# Patient Record
Sex: FEMALE | Race: WHITE | Employment: FULL TIME | URBAN - METROPOLITAN AREA
[De-identification: names, ages, dates, MRNs, and addresses within clinical notes are randomized per-mention and may not be internally consistent; named-entity substitution may affect disease eponyms.]

---

## 2018-04-30 ENCOUNTER — HOSPITAL ENCOUNTER (OUTPATIENT)
Age: 48
Setting detail: SPECIMEN
Discharge: HOME | End: 2018-04-30
Payer: COMMERCIAL

## 2018-04-30 ENCOUNTER — OFFICE VISIT (OUTPATIENT)
Dept: FAMILY MEDICINE CLINIC | Age: 48
End: 2018-04-30
Payer: COMMERCIAL

## 2018-04-30 VITALS
RESPIRATION RATE: 16 BRPM | OXYGEN SATURATION: 100 % | WEIGHT: 163.4 LBS | BODY MASS INDEX: 26.26 KG/M2 | HEIGHT: 66 IN | HEART RATE: 87 BPM | DIASTOLIC BLOOD PRESSURE: 75 MMHG | SYSTOLIC BLOOD PRESSURE: 124 MMHG

## 2018-04-30 DIAGNOSIS — G90.A POTS (POSTURAL ORTHOSTATIC TACHYCARDIA SYNDROME): ICD-10-CM

## 2018-04-30 DIAGNOSIS — M43.6 NECK STIFFNESS: ICD-10-CM

## 2018-04-30 DIAGNOSIS — Z00.00 ROUTINE GENERAL MEDICAL EXAMINATION AT A HEALTH CARE FACILITY: ICD-10-CM

## 2018-04-30 DIAGNOSIS — Z76.89 ENCOUNTER TO ESTABLISH CARE: Primary | ICD-10-CM

## 2018-04-30 DIAGNOSIS — R53.83 FATIGUE, UNSPECIFIED TYPE: ICD-10-CM

## 2018-04-30 DIAGNOSIS — J34.89 SINUS PRESSURE: ICD-10-CM

## 2018-04-30 LAB
ABSOLUTE EOS #: 0.4 K/UL (ref 0–0.44)
ABSOLUTE IMMATURE GRANULOCYTE: <0.03 K/UL (ref 0–0.3)
ABSOLUTE LYMPH #: 1.15 K/UL (ref 1.1–3.7)
ABSOLUTE MONO #: 0.72 K/UL (ref 0.1–1.2)
ANION GAP SERPL CALCULATED.3IONS-SCNC: 10 MMOL/L (ref 9–17)
BASOPHILS # BLD: 1 % (ref 0–2)
BASOPHILS ABSOLUTE: 0.04 K/UL (ref 0–0.2)
BUN BLDV-MCNC: 9 MG/DL (ref 6–20)
BUN/CREAT BLD: NORMAL (ref 9–20)
C-REACTIVE PROTEIN: 22.3 MG/L (ref 0–5)
CALCIUM SERPL-MCNC: 8.9 MG/DL (ref 8.6–10.4)
CHLORIDE BLD-SCNC: 105 MMOL/L (ref 98–107)
CHOLESTEROL/HDL RATIO: 2.6
CHOLESTEROL: 146 MG/DL
CO2: 25 MMOL/L (ref 20–31)
CREAT SERPL-MCNC: 0.58 MG/DL (ref 0.5–0.9)
DIFFERENTIAL TYPE: ABNORMAL
EOSINOPHILS RELATIVE PERCENT: 5 % (ref 1–4)
FERRITIN: 69 UG/L (ref 13–150)
GFR AFRICAN AMERICAN: >60 ML/MIN
GFR NON-AFRICAN AMERICAN: >60 ML/MIN
GFR SERPL CREATININE-BSD FRML MDRD: NORMAL ML/MIN/{1.73_M2}
GFR SERPL CREATININE-BSD FRML MDRD: NORMAL ML/MIN/{1.73_M2}
GLUCOSE BLD-MCNC: 93 MG/DL (ref 70–99)
HCT VFR BLD CALC: 40.4 % (ref 36.3–47.1)
HDLC SERPL-MCNC: 56 MG/DL
HEMOGLOBIN: 12.8 G/DL (ref 11.9–15.1)
IMMATURE GRANULOCYTES: 0 %
LDL CHOLESTEROL: 82 MG/DL (ref 0–130)
LYMPHOCYTES # BLD: 14 % (ref 24–43)
MCH RBC QN AUTO: 29 PG (ref 25.2–33.5)
MCHC RBC AUTO-ENTMCNC: 31.7 G/DL (ref 28.4–34.8)
MCV RBC AUTO: 91.6 FL (ref 82.6–102.9)
MONOCYTES # BLD: 8 % (ref 3–12)
NRBC AUTOMATED: 0 PER 100 WBC
PDW BLD-RTO: 12.6 % (ref 11.8–14.4)
PLATELET # BLD: 196 K/UL (ref 138–453)
PLATELET ESTIMATE: ABNORMAL
PMV BLD AUTO: 10.7 FL (ref 8.1–13.5)
POTASSIUM SERPL-SCNC: 4.1 MMOL/L (ref 3.7–5.3)
RBC # BLD: 4.41 M/UL (ref 3.95–5.11)
RBC # BLD: ABNORMAL 10*6/UL
SEDIMENTATION RATE, ERYTHROCYTE: 5 MM (ref 0–20)
SEG NEUTROPHILS: 72 % (ref 36–65)
SEGMENTED NEUTROPHILS ABSOLUTE COUNT: 6.2 K/UL (ref 1.5–8.1)
SODIUM BLD-SCNC: 140 MMOL/L (ref 135–144)
TRIGL SERPL-MCNC: 42 MG/DL
TSH SERPL DL<=0.05 MIU/L-ACNC: 2.05 MIU/L (ref 0.3–5)
VITAMIN B-12: 614 PG/ML (ref 232–1245)
VLDLC SERPL CALC-MCNC: NORMAL MG/DL (ref 1–30)
WBC # BLD: 8.5 K/UL (ref 3.5–11.3)
WBC # BLD: ABNORMAL 10*3/UL

## 2018-04-30 PROCEDURE — 99204 OFFICE O/P NEW MOD 45 MIN: CPT | Performed by: FAMILY MEDICINE

## 2018-04-30 RX ORDER — FLUTICASONE PROPIONATE 50 MCG
2 SPRAY, SUSPENSION (ML) NASAL DAILY
Qty: 1 BOTTLE | Refills: 1 | Status: SHIPPED | OUTPATIENT
Start: 2018-04-30 | End: 2022-10-06 | Stop reason: CLARIF

## 2018-04-30 RX ORDER — CYCLOBENZAPRINE HCL 5 MG
5 TABLET ORAL 3 TIMES DAILY PRN
Qty: 60 TABLET | Refills: 0 | Status: SHIPPED | OUTPATIENT
Start: 2018-04-30 | End: 2018-05-10

## 2018-04-30 RX ORDER — PYRIDOSTIGMINE BROMIDE 60 MG/1
60 TABLET ORAL 2 TIMES DAILY
COMMUNITY

## 2018-04-30 ASSESSMENT — PATIENT HEALTH QUESTIONNAIRE - PHQ9
SUM OF ALL RESPONSES TO PHQ QUESTIONS 1-9: 0
2. FEELING DOWN, DEPRESSED OR HOPELESS: 0
SUM OF ALL RESPONSES TO PHQ9 QUESTIONS 1 & 2: 0
1. LITTLE INTEREST OR PLEASURE IN DOING THINGS: 0

## 2018-04-30 ASSESSMENT — ENCOUNTER SYMPTOMS
BLOOD IN STOOL: 0
SHORTNESS OF BREATH: 0
EYE PAIN: 0

## 2018-05-01 LAB
ESTIMATED AVERAGE GLUCOSE: 103 MG/DL
HBA1C MFR BLD: 5.2 % (ref 4–6)

## 2019-02-15 ENCOUNTER — OFFICE VISIT (OUTPATIENT)
Dept: FAMILY MEDICINE CLINIC | Age: 49
End: 2019-02-15
Payer: COMMERCIAL

## 2019-02-15 VITALS
BODY MASS INDEX: 24.75 KG/M2 | RESPIRATION RATE: 16 BRPM | SYSTOLIC BLOOD PRESSURE: 128 MMHG | HEIGHT: 66 IN | WEIGHT: 154 LBS | OXYGEN SATURATION: 100 % | DIASTOLIC BLOOD PRESSURE: 85 MMHG | HEART RATE: 83 BPM

## 2019-02-15 DIAGNOSIS — R05.9 COUGH: Primary | ICD-10-CM

## 2019-02-15 DIAGNOSIS — R06.2 WHEEZING: ICD-10-CM

## 2019-02-15 DIAGNOSIS — Z12.39 SCREENING FOR BREAST CANCER: ICD-10-CM

## 2019-02-15 DIAGNOSIS — R06.09 DYSPNEA ON EXERTION: ICD-10-CM

## 2019-02-15 DIAGNOSIS — R09.81 CONGESTION OF NASAL SINUS: ICD-10-CM

## 2019-02-15 PROCEDURE — 99214 OFFICE O/P EST MOD 30 MIN: CPT | Performed by: FAMILY MEDICINE

## 2019-02-15 RX ORDER — BUDESONIDE AND FORMOTEROL FUMARATE DIHYDRATE 160; 4.5 UG/1; UG/1
2 AEROSOL RESPIRATORY (INHALATION) 2 TIMES DAILY
Qty: 1 INHALER | Refills: 3 | Status: SHIPPED | OUTPATIENT
Start: 2019-02-15 | End: 2019-05-15 | Stop reason: SDUPTHER

## 2019-02-15 RX ORDER — ALBUTEROL SULFATE 1.25 MG/3ML
1 SOLUTION RESPIRATORY (INHALATION) EVERY 6 HOURS PRN
COMMUNITY
End: 2019-02-15 | Stop reason: SDUPTHER

## 2019-02-15 RX ORDER — ALBUTEROL SULFATE 90 UG/1
2 AEROSOL, METERED RESPIRATORY (INHALATION) EVERY 6 HOURS PRN
Qty: 1 INHALER | Refills: 2 | Status: SHIPPED | OUTPATIENT
Start: 2019-02-15 | End: 2021-08-19

## 2019-02-15 RX ORDER — ALBUTEROL SULFATE 90 UG/1
2 AEROSOL, METERED RESPIRATORY (INHALATION) EVERY 6 HOURS PRN
COMMUNITY
End: 2019-02-15 | Stop reason: SDUPTHER

## 2019-02-15 RX ORDER — ALBUTEROL SULFATE 1.25 MG/3ML
1 SOLUTION RESPIRATORY (INHALATION) EVERY 6 HOURS PRN
Qty: 360 ML | Refills: 2 | Status: SHIPPED | OUTPATIENT
Start: 2019-02-15 | End: 2021-08-19

## 2019-02-15 ASSESSMENT — ENCOUNTER SYMPTOMS
EYE PAIN: 0
VOICE CHANGE: 1
SHORTNESS OF BREATH: 1
WHEEZING: 1
BLOOD IN STOOL: 0
COUGH: 1
SORE THROAT: 0
TROUBLE SWALLOWING: 0

## 2019-02-15 ASSESSMENT — PATIENT HEALTH QUESTIONNAIRE - PHQ9
1. LITTLE INTEREST OR PLEASURE IN DOING THINGS: 0
2. FEELING DOWN, DEPRESSED OR HOPELESS: 0
SUM OF ALL RESPONSES TO PHQ9 QUESTIONS 1 & 2: 0
SUM OF ALL RESPONSES TO PHQ QUESTIONS 1-9: 0
SUM OF ALL RESPONSES TO PHQ QUESTIONS 1-9: 0

## 2019-02-19 DIAGNOSIS — Z12.39 SCREENING FOR BREAST CANCER: ICD-10-CM

## 2019-03-07 ENCOUNTER — HOSPITAL ENCOUNTER (OUTPATIENT)
Dept: GENERAL RADIOLOGY | Age: 49
Discharge: HOME | End: 2019-03-09
Payer: COMMERCIAL

## 2019-03-07 ENCOUNTER — HOSPITAL ENCOUNTER (OUTPATIENT)
Age: 49
Discharge: HOME | End: 2019-03-09
Payer: COMMERCIAL

## 2019-03-07 ENCOUNTER — HOSPITAL ENCOUNTER (OUTPATIENT)
Dept: NEUROLOGY | Age: 49
Discharge: HOME | End: 2019-03-07
Payer: COMMERCIAL

## 2019-03-07 DIAGNOSIS — R05.9 COUGH: ICD-10-CM

## 2019-03-07 DIAGNOSIS — R06.2 WHEEZING: ICD-10-CM

## 2019-03-07 DIAGNOSIS — R06.09 DYSPNEA ON EXERTION: ICD-10-CM

## 2019-03-07 PROCEDURE — 94060 EVALUATION OF WHEEZING: CPT

## 2019-03-07 PROCEDURE — 94664 DEMO&/EVAL PT USE INHALER: CPT

## 2019-03-07 PROCEDURE — 71046 X-RAY EXAM CHEST 2 VIEWS: CPT

## 2019-03-07 PROCEDURE — 94726 PLETHYSMOGRAPHY LUNG VOLUMES: CPT

## 2019-03-07 PROCEDURE — 94729 DIFFUSING CAPACITY: CPT

## 2019-03-07 PROCEDURE — 6370000000 HC RX 637 (ALT 250 FOR IP): Performed by: FAMILY MEDICINE

## 2019-03-07 RX ORDER — ALBUTEROL SULFATE 90 UG/1
2 AEROSOL, METERED RESPIRATORY (INHALATION) ONCE
Status: COMPLETED | OUTPATIENT
Start: 2019-03-07 | End: 2019-03-07

## 2019-03-07 RX ADMIN — ALBUTEROL SULFATE 2 PUFF: 90 AEROSOL, METERED RESPIRATORY (INHALATION) at 13:43

## 2019-03-08 ENCOUNTER — OFFICE VISIT (OUTPATIENT)
Dept: FAMILY MEDICINE CLINIC | Age: 49
End: 2019-03-08
Payer: COMMERCIAL

## 2019-03-08 ENCOUNTER — HOSPITAL ENCOUNTER (OUTPATIENT)
Age: 49
Setting detail: SPECIMEN
Discharge: HOME | End: 2019-03-08
Payer: COMMERCIAL

## 2019-03-08 VITALS
OXYGEN SATURATION: 99 % | BODY MASS INDEX: 25.13 KG/M2 | HEART RATE: 71 BPM | RESPIRATION RATE: 16 BRPM | WEIGHT: 156.4 LBS | HEIGHT: 66 IN

## 2019-03-08 DIAGNOSIS — R05.3 CHRONIC COUGH: ICD-10-CM

## 2019-03-08 DIAGNOSIS — R05.3 CHRONIC COUGH: Primary | ICD-10-CM

## 2019-03-08 DIAGNOSIS — Z00.00 ROUTINE GENERAL MEDICAL EXAMINATION AT A HEALTH CARE FACILITY: ICD-10-CM

## 2019-03-08 LAB
ANION GAP SERPL CALCULATED.3IONS-SCNC: 12 MMOL/L (ref 9–17)
BUN BLDV-MCNC: 13 MG/DL (ref 6–20)
BUN/CREAT BLD: NORMAL (ref 9–20)
CALCIUM SERPL-MCNC: 9 MG/DL (ref 8.6–10.4)
CHLORIDE BLD-SCNC: 107 MMOL/L (ref 98–107)
CHOLESTEROL/HDL RATIO: 2.7
CHOLESTEROL: 164 MG/DL
CO2: 21 MMOL/L (ref 20–31)
CREAT SERPL-MCNC: 0.62 MG/DL (ref 0.5–0.9)
ESTIMATED AVERAGE GLUCOSE: 105 MG/DL
GFR AFRICAN AMERICAN: >60 ML/MIN
GFR NON-AFRICAN AMERICAN: >60 ML/MIN
GFR SERPL CREATININE-BSD FRML MDRD: NORMAL ML/MIN/{1.73_M2}
GFR SERPL CREATININE-BSD FRML MDRD: NORMAL ML/MIN/{1.73_M2}
GLUCOSE BLD-MCNC: 85 MG/DL (ref 70–99)
HBA1C MFR BLD: 5.3 % (ref 4–6)
HCT VFR BLD CALC: 40.5 % (ref 36.3–47.1)
HDLC SERPL-MCNC: 60 MG/DL
HEMOGLOBIN: 13.1 G/DL (ref 11.9–15.1)
LDL CHOLESTEROL: 92 MG/DL (ref 0–130)
MCH RBC QN AUTO: 29.9 PG (ref 25.2–33.5)
MCHC RBC AUTO-ENTMCNC: 32.3 G/DL (ref 28.4–34.8)
MCV RBC AUTO: 92.5 FL (ref 82.6–102.9)
NRBC AUTOMATED: 0 PER 100 WBC
PDW BLD-RTO: 13.1 % (ref 11.8–14.4)
PLATELET # BLD: 254 K/UL (ref 138–453)
PMV BLD AUTO: 10.6 FL (ref 8.1–13.5)
POTASSIUM SERPL-SCNC: 4 MMOL/L (ref 3.7–5.3)
RBC # BLD: 4.38 M/UL (ref 3.95–5.11)
SODIUM BLD-SCNC: 140 MMOL/L (ref 135–144)
TRIGL SERPL-MCNC: 59 MG/DL
VLDLC SERPL CALC-MCNC: NORMAL MG/DL (ref 1–30)
WBC # BLD: 7.2 K/UL (ref 3.5–11.3)

## 2019-03-08 PROCEDURE — 99213 OFFICE O/P EST LOW 20 MIN: CPT | Performed by: FAMILY MEDICINE

## 2019-03-08 RX ORDER — MONTELUKAST SODIUM 10 MG/1
10 TABLET ORAL DAILY
Qty: 30 TABLET | Refills: 5 | Status: SHIPPED | OUTPATIENT
Start: 2019-03-08 | End: 2020-10-19 | Stop reason: ALTCHOICE

## 2019-03-08 RX ORDER — PREDNISONE 20 MG/1
40 TABLET ORAL DAILY
Qty: 10 TABLET | Refills: 0 | Status: SHIPPED | OUTPATIENT
Start: 2019-03-08 | End: 2019-03-13

## 2019-03-08 ASSESSMENT — ENCOUNTER SYMPTOMS
WHEEZING: 1
COUGH: 1
SHORTNESS OF BREATH: 0

## 2019-03-09 LAB — HIV AG/AB: NONREACTIVE

## 2019-05-15 ENCOUNTER — OFFICE VISIT (OUTPATIENT)
Dept: PULMONOLOGY | Age: 49
End: 2019-05-15
Payer: COMMERCIAL

## 2019-05-15 ENCOUNTER — HOSPITAL ENCOUNTER (OUTPATIENT)
Age: 49
Setting detail: SPECIMEN
Discharge: HOME | End: 2019-05-15
Payer: COMMERCIAL

## 2019-05-15 VITALS
TEMPERATURE: 97.4 F | BODY MASS INDEX: 25.71 KG/M2 | DIASTOLIC BLOOD PRESSURE: 91 MMHG | OXYGEN SATURATION: 96 % | SYSTOLIC BLOOD PRESSURE: 145 MMHG | HEIGHT: 66 IN | RESPIRATION RATE: 14 BRPM | WEIGHT: 160 LBS | HEART RATE: 94 BPM

## 2019-05-15 DIAGNOSIS — J45.50 SEVERE PERSISTENT ACUTE ASTHMATIC BRONCHITIS: ICD-10-CM

## 2019-05-15 DIAGNOSIS — J45.51 SEVERE PERSISTENT ASTHMA WITH ACUTE EXACERBATION: Primary | ICD-10-CM

## 2019-05-15 PROCEDURE — 99204 OFFICE O/P NEW MOD 45 MIN: CPT | Performed by: INTERNAL MEDICINE

## 2019-05-15 RX ORDER — PREDNISONE 10 MG/1
10 TABLET ORAL 2 TIMES DAILY
Qty: 10 TABLET | Refills: 0 | Status: SHIPPED | OUTPATIENT
Start: 2019-05-15 | End: 2019-05-20

## 2019-05-15 RX ORDER — BUDESONIDE AND FORMOTEROL FUMARATE DIHYDRATE 160; 4.5 UG/1; UG/1
2 AEROSOL RESPIRATORY (INHALATION) 2 TIMES DAILY
Qty: 1 INHALER | Refills: 3 | Status: SHIPPED | OUTPATIENT
Start: 2019-05-15 | End: 2021-08-19

## 2019-05-15 ASSESSMENT — ASTHMA QUESTIONNAIRES
QUESTION_4 LAST FOUR WEEKS HOW OFTEN HAVE YOU USED YOUR RESCUE INHALER OR NEBULIZER MEDICATION (SUCH AS ALBUTEROL): 1
QUESTION_2 LAST FOUR WEEKS HOW OFTEN HAVE YOU HAD SHORTNESS OF BREATH: 1
QUESTION_5 LAST FOUR WEEKS HOW WOULD YOU RATE YOUR ASTHMA CONTROL: 2
QUESTION_1 LAST FOUR WEEKS HOW MUCH OF THE TIME DID YOUR ASTHMA KEEP YOU FROM GETTING AS MUCH DONE AT WORK, SCHOOL OR AT HOME: 3
ACT_TOTALSCORE: 8
QUESTION_3 LAST FOUR WEEKS HOW OFTEN DID YOUR ASTHMA SYMPTOMS (WHEEZING, COUGHING, SHORTNESS OF BREATH, CHEST TIGHTNESS OR PAIN) WAKE YOU UP AT NIGHT OR EARLIER THAN USUAL IN THE MORNING: 1

## 2019-05-15 NOTE — PROGRESS NOTES
PULMONARY MEDICINE OUTPATIENT  CONSULT                                                                        Patient:  Lis Gaspar  YOB: 1970  MRN: [id number]     REFERRED BY: Buck Appiah MD     HISTORY     CHIEF COMPLIANT/REASON FOR CONSULTATION: Cough    HISTORY OF PRESENT ILLNESS:    Lis Gaspar is an 51 y.o.  female for initial evaluation of asthma, currently in exacerbation. She has not been previously diagnosed with asthma. Her symptoms began in Dec 2018 and she has been previously treated twice with steroids/antibiotic courses. Associated symptoms include nonproductive cough and wheezing. She reports that her cough and wheezing keeps her up throughout the night. Symptoms have been gradually worsening since their onset. She is currently on Symbicort, albuterol inhaler/nebulizer, Atrovent, Singulair and Flonase. She reports that she ran out of her Symbicort about 2 weeks back and did not get a refill as she was scheduled to see us in the clinic. Her asthma seems to be very poorly controlled and her total score on ACT is 8    ASTHMA CONTROL TEST 5/15/2019   In the past 4 weeks, how much of the time did your asthma keep you from getting as much done at work, school or at home? 3   During the past 4 weeks, how often have you had shortness of breath? 1   During the past 4 weeks, how often did your asthma symptoms (wheezing, coughing, shortness of breath, chest tightness or pain) wake you up at night or earlier than usual in the morning? 1   During the past 4 weeks, how often have you used your rescue inhaler or nebulizer medication (such as albuterol)? 1   How would you rate your asthma control during the past 4 weeks? 2   Asthma Control Test Total Score 8     She denies any history of childhood asthma or asthma symptoms during her teenage. She reports that she was previously physically active and has not had any problems with shortness of breath.   She does have a lymph nodes  Endocrine: negative for - polydipsia/polyuria or temperature intolerance  Respiratory: positive for - cough, shortness of breath and wheezing  negative for - pleuritic pain  Cardiovascular: negative for - chest pain, palpitations or paroxysmal nocturnal dyspnea  Gastrointestinal: negative for - abdominal pain, change in bowel habits or nausea/vomiting  Genito-Urinary: no dysuria, trouble voiding, or hematuria  Musculoskeletal: negative for - joint pain or joint swelling  Neurological: negative for - headaches, impaired coordination/balance, speech problems or weakness  Dermatological: negative for - rash or skin lesion changes     ALLERGIES:    No Known Allergies  MEDICATIONS:   Outpatient Encounter Medications as of 5/15/2019   Medication Sig Dispense Refill    budesonide-formoterol (SYMBICORT) 160-4.5 MCG/ACT AERO Inhale 2 puffs into the lungs 2 times daily 1 Inhaler 3    tiotropium (SPIRIVA HANDIHALER) 18 MCG inhalation capsule Inhale 1 capsule into the lungs daily 30 capsule 3    predniSONE (DELTASONE) 10 MG tablet Take 1 tablet by mouth 2 times daily for 5 days 10 tablet 0    montelukast (SINGULAIR) 10 MG tablet Take 1 tablet by mouth daily 30 tablet 5    albuterol sulfate  (90 Base) MCG/ACT inhaler Inhale 2 puffs into the lungs every 6 hours as needed for Wheezing 1 Inhaler 2    albuterol (ACCUNEB) 1.25 MG/3ML nebulizer solution Inhale 3 mLs into the lungs every 6 hours as needed for Wheezing 360 mL 2    pyridostigmine (MESTINON) 60 MG tablet Take 60 mg by mouth 2 times daily       fluticasone (FLONASE) 50 MCG/ACT nasal spray 2 sprays by Nasal route daily 1 Bottle 1    [DISCONTINUED] budesonide-formoterol (SYMBICORT) 160-4.5 MCG/ACT AERO Inhale 2 puffs into the lungs 2 times daily 1 Inhaler 3    [DISCONTINUED] ipratropium (ATROVENT HFA) 17 MCG/ACT inhaler Inhale 1 puff into the lungs every 6-8 hours as needed for Wheezing (cough) 1 Inhaler 2     No facility-administered encounter 04/30/2018    RBC 4.38 03/08/2019    MCH 29.9 03/08/2019    MCHC 32.3 03/08/2019    RDW 13.1 03/08/2019     BMP:   Lab Results   Component Value Date     03/08/2019    K 4.0 03/08/2019     03/08/2019    CO2 21 03/08/2019    BUN 13 03/08/2019    CREATININE 0.62 03/08/2019    GLUCOSE 85 03/08/2019    CALCIUM 9.0 03/08/2019       Liver Function Test:   No results found for: ALT, AST, GGT, ALKPHOS, BILITOT  Coagulation Profile:   No results found for: INR, PROTIME, APTT  Cardiac Enzymes:  No results found for: CKTOTAL, CKMB, CKMBINDEX, TROPONINI  Lactic Acid:  No results found for: LACTA  BNP:   No results found for: BNP  D-Dimer:  No results found for: DDIMER  Others labs:  Lab Results   Component Value Date    TSH 2.05 04/30/2018     No results found for: Marysol [place]  Lab Results   Component Value Date    SEDRATE 5 04/30/2018    CRP 22.3 (H) 04/30/2018     Lab Results   Component Value Date    FERRITIN 69 04/30/2018     No results found for: SPEP, UPEP  No results found for: PSA, CEA, , [id number],     Radiological reports:  CXR: 3/7/19  FINDINGS:   Heart size is normal.  No vascular congestion, focal consolidation, effusion,   or pneumothorax is noted.  Osseous and mediastinal structures are   age-appropriate.           Impression   No acute cardiopulmonary disease. CT Scans:    Pulmonary Function Test: 3/7/19  IMPRESSION:  Moderate-to-severe obstructive pulmonary disease with air  trapping, normal diffusion and significant improvement after  bronchodilators noted in FEV1 only. Clinical correlation is  recommended. Echocardiogram:    Stress Test/Cardiac Angiography:    Polysomnography:    ASSESSMENT AND PLAN     Assessment:  1.  Severe persistent asthma with acute exacerbation      Plan/Recommendations:  Pulmonary function tests were reviewed  Chest x-ray was reviewed  Prescribed a 5 day course of prednisone and Spiriva  Will check IgE and eosinophil count  Refills were provided for Symbicort  Continue albuterol inhaler/nebulizer, along with Singulair and Flonase. We'll recommend to discontinue Atrovent inhaler  Educated and clarified the medication use. Discussed use, benefit, and side effects of prescribed medications. Barriers to medication compliance addressed. Patient was recommended to have prednisone available for use during an exacerbation  Influenza Vaccine: Recommend flu vaccination in the fall annually. Indicated for current flu vaccination season Oct. to Feb.  Pneumococcal Vaccine: Recommendations given regarding pneumococcal vaccinations. Deferred  Lung cancer Screening: After reviewing the patient's smoking history and age patient LOW DOSE SCREENING CT is NOT INDICATED; Nonsmoker/Smoking <30 pack-years   Patient received counseling on the following healthy behaviors: nutrition, exercise and medication adherence  Maintain an active lifestyle    All the questions that the patient had were answered to his/her satisfaction  We'll see the patient back in 4 weeks  Thank you for having us involved in the care of your patient. Please call us if you have any questions or concerns. Néstor Ramirez MD    Pulmonary and Critical Care Medicine          5/15/2019, 1:56 PM    Please note that this chart was generated using voice recognition Dragon dictation software. Although every effort was made to ensure the accuracy of this automated transcription, some errors in transcription may have occurred.

## 2019-05-16 LAB
EOSINOPHILS ABSOLUTE: 823 /UL (ref 200–400)
EOSINOPHILS RELATIVE PERCENT: ABNORMAL %
IGE: 199 IU/ML
WBC # BLD: ABNORMAL K/UL

## 2020-07-27 ENCOUNTER — OFFICE VISIT (OUTPATIENT)
Dept: FAMILY MEDICINE CLINIC | Age: 50
End: 2020-07-27
Payer: COMMERCIAL

## 2020-07-27 VITALS
HEART RATE: 95 BPM | HEIGHT: 64 IN | BODY MASS INDEX: 26.91 KG/M2 | WEIGHT: 157.6 LBS | TEMPERATURE: 98.3 F | OXYGEN SATURATION: 98 % | SYSTOLIC BLOOD PRESSURE: 132 MMHG | DIASTOLIC BLOOD PRESSURE: 87 MMHG

## 2020-07-27 PROCEDURE — 99396 PREV VISIT EST AGE 40-64: CPT | Performed by: FAMILY MEDICINE

## 2020-07-27 RX ORDER — ZOSTER VACCINE RECOMBINANT, ADJUVANTED 50 MCG/0.5
0.5 KIT INTRAMUSCULAR SEE ADMIN INSTRUCTIONS
Qty: 0.5 ML | Refills: 1 | Status: SHIPPED | OUTPATIENT
Start: 2020-07-27 | End: 2021-01-23

## 2020-07-27 ASSESSMENT — PATIENT HEALTH QUESTIONNAIRE - PHQ9
SUM OF ALL RESPONSES TO PHQ9 QUESTIONS 1 & 2: 0
1. LITTLE INTEREST OR PLEASURE IN DOING THINGS: 0
2. FEELING DOWN, DEPRESSED OR HOPELESS: 0
SUM OF ALL RESPONSES TO PHQ QUESTIONS 1-9: 0
SUM OF ALL RESPONSES TO PHQ QUESTIONS 1-9: 0

## 2020-07-27 NOTE — PROGRESS NOTES
Subjective:       Carson Byrnes is a 47 y.o. female and is here for a comprehensive physical exam.  The patient reports no problems    . [employer]. . 2 kids. 2 daughters. No violence at the current living place. No concerns about sexual transmitted diseases. Tobacco use: None  Alcohol use: None  Other drug use: None  Depressed: Not depressed    Mom 70 yo - healthy  Dad 73 yo - pancreatic cancer. . Siblings: 2 - well. Vaccinations: yes. Mammogram: Is due for mammogram.  Pap smear: Will follow-up with gynecologist for the next year. Colonoscopy: Never had a colonoscopy done.  History:  Any STD's in the past? none  Past Medical History:   Diagnosis Date    Chronic cough     Neurocardiogenic syncope     POTS (postural orthostatic tachycardia syndrome)     Wheezing      Patient Active Problem List    Diagnosis Date Noted    Chronic cough 2019    POTS (postural orthostatic tachycardia syndrome) 2018    Fatigue 2018    Neck stiffness 2018     No past surgical history on file.   Family History   Problem Relation Age of Onset    Cancer Father     Other Father      Social History     Socioeconomic History    Marital status:      Spouse name: Not on file    Number of children: Not on file    Years of education: Not on file    Highest education level: Not on file   Occupational History    Not on file   Social Needs    Financial resource strain: Not on file    Food insecurity     Worry: Not on file     Inability: Not on file   [employer] needs     Medical: Not on file     Non-medical: Not on file   Tobacco Use    Smoking status: Former Smoker    Smokeless tobacco: Never Used   Substance and Sexual Activity    Alcohol use: No    Drug use: No    Sexual activity: Yes   Lifestyle    Physical activity     Days per week: Not on file     Minutes per session: Not on file    Stress: Not on file   Relationships    Social connections     Talks on phone: Not on file     Gets together: Not on file     Attends Holiness service: Not on file     Active member of club or organization: Not on file     Attends meetings of clubs or organizations: Not on file     Relationship status: Not on file    Intimate partner violence     Fear of current or ex partner: Not on file     Emotionally abused: Not on file     Physically abused: Not on file     Forced sexual activity: Not on file   Other Topics Concern    Not on file   Social History Narrative    Not on file     Current Outpatient Medications   Medication Sig Dispense Refill    zoster recombinant adjuvanted vaccine (SHINGRIX) 50 MCG/0.5ML SUSR injection Inject 0.5 mLs into the muscle See Admin Instructions 1 dose now and repeat in 2-6 months 0.5 mL 1    budesonide-formoterol (SYMBICORT) 160-4.5 MCG/ACT AERO Inhale 2 puffs into the lungs 2 times daily 1 Inhaler 3    tiotropium (SPIRIVA HANDIHALER) 18 MCG inhalation capsule Inhale 1 capsule into the lungs daily 30 capsule 3    montelukast (SINGULAIR) 10 MG tablet Take 1 tablet by mouth daily 30 tablet 5    albuterol sulfate  (90 Base) MCG/ACT inhaler Inhale 2 puffs into the lungs every 6 hours as needed for Wheezing 1 Inhaler 2    albuterol (ACCUNEB) 1.25 MG/3ML nebulizer solution Inhale 3 mLs into the lungs every 6 hours as needed for Wheezing 360 mL 2    pyridostigmine (MESTINON) 60 MG tablet Take 60 mg by mouth 2 times daily       fluticasone (FLONASE) 50 MCG/ACT nasal spray 2 sprays by Nasal route daily 1 Bottle 1     No current facility-administered medications for this visit.       Current Outpatient Medications on File Prior to Visit   Medication Sig Dispense Refill    budesonide-formoterol (SYMBICORT) 160-4.5 MCG/ACT AERO Inhale 2 puffs into the lungs 2 times daily 1 Inhaler 3    tiotropium (SPIRIVA HANDIHALER) 18 MCG inhalation capsule Inhale 1 capsule into the lungs daily 30 capsule 3    montelukast (SINGULAIR) 10 MG tablet Take 1 tablet by mouth daily 30 tablet 5    albuterol sulfate  (90 Base) MCG/ACT inhaler Inhale 2 puffs into the lungs every 6 hours as needed for Wheezing 1 Inhaler 2    albuterol (ACCUNEB) 1.25 MG/3ML nebulizer solution Inhale 3 mLs into the lungs every 6 hours as needed for Wheezing 360 mL 2    pyridostigmine (MESTINON) 60 MG tablet Take 60 mg by mouth 2 times daily       fluticasone (FLONASE) 50 MCG/ACT nasal spray 2 sprays by Nasal route daily 1 Bottle 1     No current facility-administered medications on file prior to visit. No Known Allergies    Do you take any herbs or supplements that were not prescribed by a doctor? no  Are you taking calcium supplements? not applicable  Are you taking aspirin daily? not applicable    Review of Systems  Do you have pain that bothers you in your daily life? no  Review of Systems   Constitutional: Negative for fever and unexpected weight change. HENT: Negative for ear pain, congestion, sore throat and rhinorrhea. Eyes: Negative for itching and visual disturbance. Respiratory: Negative for cough and shortness of breath. Cardiovascular: Negative for chest pain and leg swelling. Gastrointestinal: Negative for diarrhea, constipation and blood in stool. Endocrine: Negative for polydipsia and polyuria. Genitourinary: Negative for dysuria and hematuria. Musculoskeletal: Negative for back pain and gait problem. Skin: Negative for color change and rash. Neurological: Negative for dizziness and headaches. Psychiatric/Behavioral: Negative for confusion and agitation. Objective:   HENT:   /87   Pulse 95   Temp 98.3 °F (36.8 °C)   Ht 5' 4\" (1.626 m)   Wt 157 lb 9.6 oz (71.5 kg)   SpO2 98%   BMI 27.05 kg/m²   Constitutional: Alert and oriented. Well-nourished. No distress. Head: Normocephalic and atraumatic. Right Ear: External ear normal. TM: no bulging, erythema or fluid seen.   Left Ear: External ear normal. TM: no bulging, erythema or fluid seen. Nose: Nose normal.   Mouth/Throat: Oropharynx is clear and moist. teeth in good repair. Eyes: Pupils are equal, round, and reactive to light. Right eye exhibits no discharge. Left eye exhibits no discharge. No scleral icterus. Neck: Normal range of motion. Neck supple. No JVD present. No tracheal deviation present. No thyromegaly present. Cardiovascular: Normal rate, regular rhythm, normal heart sounds. Pulmonary/Chest: Effort normal and breath sounds normal. No respiratory distress. She has no wheezes. She has no rales. Abdominal: Soft. Bowel sounds are normal.  She exhibits no distension and no mass. There is no tenderness. There is no rebound and no guarding. Musculoskeletal: Normal range of motion. She exhibits no edema or tenderness. Lymphadenopathy:    She has no cervical adenopathy. Neurological:  She is alert and oriented to person, place, and time. Cranial nerves grossly intact. No sensation problem noted. Muscle strength 5/5 throughout. Skin: Skin is warm and dry. No rash noted. No erythema. Freckles throughout. Psychiatric:  She has a normal mood and affect. Behavior is normal.    Christel Crooks was seen today for new patient. Diagnoses and all orders for this visit:    Encounter for well adult exam without abnormal findings  -     CBC Auto Differential; Future  -     Urinalysis; Future  -     TSH with Reflex; Future  -     Lipid Panel; Future  -     Hemoglobin A1C; Future  -     Comprehensive Metabolic Panel; Future    Encounter for screening mammogram for malignant neoplasm of breast  -     PRISCILA DIGITAL SCREEN W OR WO CAD BILATERAL; Future    Colon cancer screening  -     Cologuard; Future    Need for shingles vaccine  -     zoster recombinant adjuvanted vaccine [facility]) 50 MCG/0.5ML SUSR injection;  Inject 0.5 mLs into the muscle See Admin Instructions 1 dose now and repeat in 2-6 months    Skin cancer screening  -     Katrin Brown MD,

## 2020-10-19 ENCOUNTER — HOSPITAL ENCOUNTER (OUTPATIENT)
Age: 50
Setting detail: SPECIMEN
Discharge: HOME | End: 2020-10-19
Payer: COMMERCIAL

## 2020-10-19 ENCOUNTER — OFFICE VISIT (OUTPATIENT)
Dept: DERMATOLOGY | Age: 50
End: 2020-10-19
Payer: COMMERCIAL

## 2020-10-19 VITALS
DIASTOLIC BLOOD PRESSURE: 85 MMHG | TEMPERATURE: 97.8 F | SYSTOLIC BLOOD PRESSURE: 138 MMHG | BODY MASS INDEX: 24.89 KG/M2 | OXYGEN SATURATION: 98 % | HEART RATE: 83 BPM | HEIGHT: 64 IN | WEIGHT: 145.8 LBS

## 2020-10-19 PROCEDURE — 11102 TANGNTL BX SKIN SINGLE LES: CPT | Performed by: DERMATOLOGY

## 2020-10-19 PROCEDURE — 11103 TANGNTL BX SKIN EA SEP/ADDL: CPT | Performed by: DERMATOLOGY

## 2020-10-19 PROCEDURE — 99203 OFFICE O/P NEW LOW 30 MIN: CPT | Performed by: DERMATOLOGY

## 2020-10-19 RX ORDER — LIDOCAINE HYDROCHLORIDE AND EPINEPHRINE 10; 10 MG/ML; UG/ML
1 INJECTION, SOLUTION INFILTRATION; PERINEURAL ONCE
Status: COMPLETED | OUTPATIENT
Start: 2020-10-19 | End: 2020-10-19

## 2020-10-19 RX ADMIN — LIDOCAINE HYDROCHLORIDE AND EPINEPHRINE 1 ML: 10; 10 INJECTION, SOLUTION INFILTRATION; PERINEURAL at 14:16

## 2020-10-19 NOTE — PROGRESS NOTES
Dermatology Patient Note  [id number]  [address] [facility] [address]  Dept: [phone]  Dept Fax: [phone]: 10/19/2020   REFERRING PROVIDER: Luna Thomas MD      Olive Diaz is a 47 y.o. female  who presents today in the office for:    New Patient (Full body skin check. Sister has had skin cancer.)      HISTORY OF PRESENT ILLNESS:  47 y.o. female with h/o excessive sun exposure presents for routine skin check.     Patient reports concerning lesion:    Location: left chest  Duration: months  Symptoms: none  Course: growing  Prior biopsy: none  Prior treatment: none      CURRENT MEDICATIONS:   Current Outpatient Medications   Medication Sig Dispense Refill    albuterol sulfate  (90 Base) MCG/ACT inhaler Inhale 2 puffs into the lungs every 6 hours as needed for Wheezing 1 Inhaler 2    pyridostigmine (MESTINON) 60 MG tablet Take 60 mg by mouth 2 times daily       zoster recombinant adjuvanted vaccine (SHINGRIX) 50 MCG/0.5ML SUSR injection Inject 0.5 mLs into the muscle See Admin Instructions 1 dose now and repeat in 2-6 months 0.5 mL 1    budesonide-formoterol (SYMBICORT) 160-4.5 MCG/ACT AERO Inhale 2 puffs into the lungs 2 times daily (Patient not taking: Reported on 10/19/2020) 1 Inhaler 3    albuterol (ACCUNEB) 1.25 MG/3ML nebulizer solution Inhale 3 mLs into the lungs every 6 hours as needed for Wheezing (Patient not taking: Reported on 10/19/2020) 360 mL 2    fluticasone (FLONASE) 50 MCG/ACT nasal spray 2 sprays by Nasal route daily (Patient not taking: Reported on 10/19/2020) 1 Bottle 1     Current Facility-Administered Medications   Medication Dose Route Frequency Provider Last Rate Last Dose    lidocaine-EPINEPHrine 1 percent-1:[id number] injection 1 mL  1 mL Intradermal Once Dennise Albarado MD           ALLERGIES:   No Known Allergies    SOCIAL HISTORY:  Social History     Tobacco Use    Smoking status: Former Smoker Follows:  Assessment   1. Neoplasm of uncertain behavior of skin x 2  Left shoulder: nevus r/o atypia  Left chest: r/o BCC  Shave Biopsy: The procedure and its risks were explained including but not limited to pain, bleeding, infection, permanent scar, permanent pigment alteration and need for an additional procedure. Consent to proceed with the procedure was obtained from the patient or the parent. After cleaning with alcohol the lesion was anesthetized with 1% lidocaine with epinephrine and was removed with a dermablade. Hemostasis was achieved with aluminum chloride and Vaseline and a bandage were applied.  - Surgical Pathology; Future  - NM TANGENTIAL BIOPSY SKIN SINGLE LESION  - NM TANGENTIAL BIOPSY SKIN EA SEP/ADDITIONAL LESION  - lidocaine-EPINEPHrine 1 percent-1:[id number] injection 1 mL    2. Actinic skin damage  Counseled on sun protection: avoidance, seek shade; use clothing/hats/scarves; use generous quantity of sunscreen, re-apply every 2 hours. 3. Exposure to tanning bed, initial encounter  Counseled avoidance of tanning bed use    4. Dysplastic nevus syndrome  - annual skin checks recommend  - Clinically and Dermatoscopically Benign on Exam Today  - Reviewed ABCDE of moles and melanoma  - Discussed sunscreen and sun protection - recommend SPF 30 or greater sunscreen applied every 2-3 hours, sun protective clothing and avoidance of peak sun. RTC 6 months            Patient Instructions   BIOPSY WOUND CARE    A biopsy is where a small piece of skin tissue is removed and examined by a pathologist.  When a biopsy is done, there is a small wound site that requires proper care to prevent infection and scarring. Some biopsies require sutures and their removal.    How to Care for Biopsy Wound    A.  Leave band-aid or dressing on for 24 hours. B. Wash two times a day with soap and water.   C.  Let the wound air dry, then apply Vaseline ointment and cover with a Band-Aid       unless otherwise instructed by your provider. D. If there is slight discomfort, you may give acetaminophen or ibuprofen. When To Call the Doctor    Call the Dermatology Clinic or your doctor if any of the following occur:    A. Redness and swelling  B. Tenderness and warm to touch  C.  Drainage from wound  D. Fever    Biopsy Results    Biopsy results are usually available in 1-2 weeks. We provide biopsy results in letters for begin results or we will call for any concerning results. If you have not heard from our staff please call the office within 2 weeks. Please call our office with any concerns at [phone]. Sun Protection     There are two types of sun rays that are harmful to the skin. UVA rays cause skin aging and skin cancer, such as melanoma. UVB rays cause sunburns, cataracts, and also contribute to skin cancer. The American-Academy of Dermatology recommends that children and adults wear a broad spectrum, waterproof sunscreen with a Sun Protection Factor (SPF) of 30 or higher. It is important to check the ingredient label to be sure the sunscreen will protect the skin from both UVA and UVB sunrays. Your sunscreen should contain at least one of the following ingredients: titanium dioxide, zinc oxide, or avobenzone. Sunscreen will not be effective unless it is applied to all exposed skin. Sunscreens work best if they are applied 30 minutes before sun exposure. They should be reapplied every 2 hours and after any water exposure. Sunscreen is not perfect. It is important to use other methods to protect the skin from sun exposure also. Wear hats, sunglasses and other sun protective clothing when outdoors. Stay in the shade during the peak hours of sun exposure between 10 AM and 4 PM      Follow-up: No follow-ups on file.       This note was created with the assistance of a speech-recognition program.  Although the intention is to generate a document that actually reflects the content of the visit, no guarantees can be provided that every mistake has been identified and corrected byediting.     Electronically signed by Anastacia Gonzales MD on 10/19/20 at 10:45 AM EDT

## 2020-10-19 NOTE — PATIENT INSTRUCTIONS
and after any water exposure. Sunscreen is not perfect. It is important to use other methods to protect the skin from sun exposure also. Wear hats, sunglasses and other sun protective clothing when outdoors.   Stay in the shade during the peak hours of sun exposure between 10 AM and 4 PM

## 2020-10-21 LAB — DERMATOLOGY PATHOLOGY REPORT: NORMAL

## 2020-10-23 ENCOUNTER — TELEPHONE (OUTPATIENT)
Dept: DERMATOLOGY | Age: 50
End: 2020-10-23

## 2020-10-23 NOTE — TELEPHONE ENCOUNTER
Called again and LVM. If she calls back, you can convey the following:    Please inform patient that the lesion we biopsied is the most common and least serious form of skin cancer, a basal cell carcinoma. Given location and that this is a superficial BCC, I was going to offer imiquimod cream at night, 5 days on and 2 days off for 6 weeks. The area will become red and irritated. This will leave the least noticeable scar and has a cure rate around 70% (although typically higher for this subtype in this location). If it recurs afterwards, will have to go with another treatment option to cure it. Alternatively can excise it in clinic for 95+% cure rate and confirmation that we got it all on path. Post-procedure limitations including no heavy lifting, pushing or pulling, or strenuous exercise for 2 weeks after the procedure. The patient may have stitches that need to come out in 2 weeks or may have dissolving stitches, which will be determined during the procedure. This will leave a line scar. Somewhere in between would be ED&C or scrape and burn procedure (about 90% cure rate), but I do not necessarily recommend it in this area because the scar is typically undesirable. Let me know what she decides and if she has questions I am happy to answer them.

## 2020-10-26 RX ORDER — IMIQUIMOD 12.5 MG/.25G
CREAM TOPICAL
Qty: 12 EACH | Refills: 1 | Status: SHIPPED | OUTPATIENT
Start: 2020-10-26 | End: 2020-11-02

## 2020-10-26 NOTE — TELEPHONE ENCOUNTER
Rx sent. Please make sure she knows that we're treating hte one on the chest, not the arm. The arm was a mildly dysplastic nevus, needs only observation.

## 2021-04-26 ENCOUNTER — TELEPHONE (OUTPATIENT)
Dept: PRIMARY CARE CLINIC | Age: 51
End: 2021-04-26

## 2021-05-14 DIAGNOSIS — Z12.31 ENCOUNTER FOR SCREENING MAMMOGRAM FOR MALIGNANT NEOPLASM OF BREAST: ICD-10-CM

## 2021-08-19 ENCOUNTER — HOSPITAL ENCOUNTER (OUTPATIENT)
Age: 51
Setting detail: SPECIMEN
Discharge: HOME | End: 2021-08-19
Payer: COMMERCIAL

## 2021-08-19 ENCOUNTER — OFFICE VISIT (OUTPATIENT)
Dept: DERMATOLOGY | Age: 51
End: 2021-08-19
Payer: COMMERCIAL

## 2021-08-19 VITALS
SYSTOLIC BLOOD PRESSURE: 125 MMHG | TEMPERATURE: 97.2 F | HEIGHT: 64 IN | OXYGEN SATURATION: 98 % | DIASTOLIC BLOOD PRESSURE: 84 MMHG | WEIGHT: 147.2 LBS | HEART RATE: 80 BPM | BODY MASS INDEX: 25.13 KG/M2

## 2021-08-19 DIAGNOSIS — L82.1 SEBORRHEIC KERATOSIS: ICD-10-CM

## 2021-08-19 DIAGNOSIS — D48.5 NEOPLASM OF UNCERTAIN BEHAVIOR OF SKIN: ICD-10-CM

## 2021-08-19 DIAGNOSIS — L81.4 LENTIGO: ICD-10-CM

## 2021-08-19 DIAGNOSIS — D18.01 CHERRY ANGIOMA: ICD-10-CM

## 2021-08-19 DIAGNOSIS — L82.0 INFLAMED SEBORRHEIC KERATOSIS: ICD-10-CM

## 2021-08-19 DIAGNOSIS — Z85.828 HISTORY OF BASAL CELL CARCINOMA: Primary | ICD-10-CM

## 2021-08-19 PROCEDURE — 11102 TANGNTL BX SKIN SINGLE LES: CPT | Performed by: DERMATOLOGY

## 2021-08-19 PROCEDURE — 17110 DESTRUCTION B9 LES UP TO 14: CPT | Performed by: DERMATOLOGY

## 2021-08-19 PROCEDURE — 99213 OFFICE O/P EST LOW 20 MIN: CPT | Performed by: DERMATOLOGY

## 2021-08-19 RX ORDER — LIDOCAINE HYDROCHLORIDE AND EPINEPHRINE 10; 10 MG/ML; UG/ML
0.5 INJECTION, SOLUTION INFILTRATION; PERINEURAL ONCE
Status: SHIPPED | OUTPATIENT
Start: 2021-08-19

## 2021-08-19 NOTE — PROGRESS NOTES
Dermatology Patient Note  [facility] [address] [facility]  [address]  [address]  Dept: [phone]  Dept Fax: [phone]      VISITDATE: 8/19/2021   REFERRING PROVIDER: No ref. provider found      Karla Hemphill is a 45 y.o. female  who presents today in the office for:    Follow-up (FBSE- no concerns)      HISTORY OF PRESENT ILLNESS:  As above. Patient has history of BCC, treated with topical and only had side effects for a few days. Reports using sun protection. MEDICAL PROBLEMS:  Patient Active Problem List    Diagnosis Date Noted    Chronic cough 03/08/2019    POTS (postural orthostatic tachycardia syndrome) 04/30/2018    Fatigue 04/30/2018    Neck stiffness 04/30/2018       CURRENT MEDICATIONS:   Current Outpatient Medications   Medication Sig Dispense Refill    pyridostigmine (MESTINON) 60 MG tablet Take 60 mg by mouth 2 times daily  (Patient not taking: Reported on 8/19/2021)      fluticasone (FLONASE) 50 MCG/ACT nasal spray 2 sprays by Nasal route daily (Patient not taking: Reported on 10/19/2020) 1 Bottle 1     No current facility-administered medications for this visit. ALLERGIES:   No Known Allergies    SOCIAL HISTORY:  Social History     Tobacco Use    Smoking status: Former Smoker    Smokeless tobacco: Never Used   Substance Use Topics    Alcohol use: No       Pertinent ROS:  Review of Systems  Skin: Denies any new changing, growing or bleeding lesions or rashes except as described in the HPI   Constitutional: Denies fevers, chills, and malaise.     PHYSICAL EXAM:   /84   Pulse 80   Temp 97.2 °F (36.2 °C)   Ht 5' 4\" (1.626 m)   Wt 147 lb 3.2 oz (66.8 kg)   SpO2 98%   BMI 25.27 kg/m²     The patient is generally well appearing, well nourished, alert and conversational. Affect is normal.    Cutaneous Exam:  Physical Exam  Total body skin exam excluding external genitalia: head/face, neck, both arms, chest, back, abdomen, both legs, buttocks, digits and/or nails, was examined. Genital exam was deferred as patient denied having any lesions in this area. Complete visualization of scalp may be limited by hair density, length, and/or style    Facial covering was removed during examination. Diagnoses/exam findings/medical history pertinent to this visit are listed below:    Assessment:   Diagnosis Orders   1. History of basal cell carcinoma     2. Cherry angioma     3. Seborrheic keratosis     4. Lentigo     5. Inflamed seborrheic keratosis          Plan:  History of BCC  - well healed scar with no nodularity  - no evidence of recurrence    Cherry angiomas of ext  - reassurance and education    Solar lentigo of back, ext  - patient was counseled that UV-damaged skin increases lifetime risk for skin cancer  - I recommended the patient apply broad spectrum spf 30+ sunscreen daily, reapplying every 2 hours. - In additional to regular use of sunscreen, I recommended broad-rimmed hats, long sleeves, and seeking the shade. Seborrheic keratoses of back  - reassurance and education    Inflamed seborrheic keratoses of back  Cryotherapy: After verbal consent was obtained including discussion of the risks (lesion persistence, lesion recurrence and hypo/hyperpigmentation) and benefits (resolution of the lesion) 1 total Inflamed Seborrheic Keratosis on the back were treated with liquid nitrogen to achieve a 2-3 mm freeze border. Multiple nevi  - Clinically and dermatoscopically benign on exam today. - Common nevi have a low individual risk of developing into melanoma. Patients with >50 nevi have a greater risk of developing melanoma in their lifetime and should undergo skin checks at least annually. - I recommended the patient apply broad spectrum spf 30+ sunscreen daily, reapplying every 2 hours  - In additional to regular use of sunscreen, I recommended broad-rimmed hats, long sleeves, and seeking the shade.     Nevus r/o atypia of the right abdomen  Shave Biopsy: The procedure and its risks were explained including but not limited to pain, bleeding, infection, permanent scar, permanent pigment alteration and need for an additional procedure. Consent to proceed with the procedure was obtained from the patient or the parent. After cleaning with alcohol the lesion was anesthetized with 1% lidocaine with epinephrine and was removed with a dermablade. Hemostasis was achieved with aluminum chloride and Vaseline and a bandage were applied. RTC 1 year    No future appointments. There are no Patient Instructions on file for this visit. This note was created with the assistance of a speech-recognition program.  Although the intention is to generate a document that actually reflects the content of the visit, no guarantees can be provided that every mistake has been identified and corrected by editing. I, Dr. Gregoria Gaviria, personally performed the services described in this documentation, as scribed by Maribel Catalan in my presence, and it is both accurate and complete.    Electronically signed by Jareth Rivero MD on 8/19/21 at 1:02 PM EDT

## 2021-08-19 NOTE — PATIENT INSTRUCTIONS

## 2021-08-25 ENCOUNTER — NURSE ONLY (OUTPATIENT)
Dept: FAMILY MEDICINE CLINIC | Age: 51
End: 2021-08-25

## 2021-08-25 ENCOUNTER — HOSPITAL ENCOUNTER (OUTPATIENT)
Age: 51
Setting detail: SPECIMEN
Discharge: HOME | End: 2021-08-25
Payer: COMMERCIAL

## 2021-08-25 DIAGNOSIS — R30.0 DYSURIA: Primary | ICD-10-CM

## 2021-08-25 DIAGNOSIS — R30.0 DYSURIA: ICD-10-CM

## 2021-08-25 LAB — DERMATOLOGY PATHOLOGY REPORT: NORMAL

## 2021-08-25 NOTE — RESULT ENCOUNTER NOTE
Please inform patient that mole was mild-moderately atypical. Recent studies indicate that even moderately atypical moles can be observed and have very minimal risk of becoming skin cancer. I recommend we watch it at f/u and if re-growing significantly, can re-excise at that time.

## 2021-08-26 LAB
CULTURE: NORMAL
Lab: NORMAL
SPECIMEN DESCRIPTION: NORMAL

## 2021-08-27 ENCOUNTER — TELEPHONE (OUTPATIENT)
Dept: FAMILY MEDICINE CLINIC | Age: 51
End: 2021-08-27

## 2021-08-27 DIAGNOSIS — Z12.11 COLON CANCER SCREENING: Primary | ICD-10-CM

## 2021-10-20 ENCOUNTER — OFFICE VISIT (OUTPATIENT)
Dept: FAMILY MEDICINE CLINIC | Age: 51
End: 2021-10-20
Payer: COMMERCIAL

## 2021-10-20 ENCOUNTER — HOSPITAL ENCOUNTER (OUTPATIENT)
Age: 51
Setting detail: SPECIMEN
Discharge: HOME | End: 2021-10-20
Payer: COMMERCIAL

## 2021-10-20 VITALS
SYSTOLIC BLOOD PRESSURE: 126 MMHG | DIASTOLIC BLOOD PRESSURE: 81 MMHG | HEART RATE: 103 BPM | OXYGEN SATURATION: 99 % | TEMPERATURE: 98.1 F | HEIGHT: 65 IN | BODY MASS INDEX: 24.83 KG/M2 | WEIGHT: 149 LBS

## 2021-10-20 DIAGNOSIS — Z11.59 NEED FOR HEPATITIS C SCREENING TEST: ICD-10-CM

## 2021-10-20 DIAGNOSIS — Z00.00 ENCOUNTER FOR GENERAL ADULT MEDICAL EXAMINATION WITHOUT ABNORMAL FINDINGS: Primary | ICD-10-CM

## 2021-10-20 DIAGNOSIS — Z00.00 ENCOUNTER FOR GENERAL ADULT MEDICAL EXAMINATION WITHOUT ABNORMAL FINDINGS: ICD-10-CM

## 2021-10-20 DIAGNOSIS — Z12.11 COLON CANCER SCREENING: ICD-10-CM

## 2021-10-20 LAB
ABSOLUTE EOS #: 0 K/UL (ref 0–0.4)
ABSOLUTE IMMATURE GRANULOCYTE: 0 K/UL (ref 0–0.3)
ABSOLUTE LYMPH #: 0.64 K/UL (ref 1–4.8)
ABSOLUTE MONO #: 1.02 K/UL (ref 0.1–0.8)
ALBUMIN SERPL-MCNC: 4.6 G/DL (ref 3.5–5.2)
ALBUMIN/GLOBULIN RATIO: 1.5 (ref 1–2.5)
ALP BLD-CCNC: 66 U/L (ref 35–104)
ALT SERPL-CCNC: 13 U/L (ref 5–33)
ANION GAP SERPL CALCULATED.3IONS-SCNC: 18 MMOL/L (ref 9–17)
AST SERPL-CCNC: 15 U/L
BASOPHILS # BLD: 0 % (ref 0–2)
BASOPHILS ABSOLUTE: 0 K/UL (ref 0–0.2)
BILIRUB SERPL-MCNC: 0.35 MG/DL (ref 0.3–1.2)
BILIRUBIN URINE: NEGATIVE
BUN BLDV-MCNC: 8 MG/DL (ref 6–20)
BUN/CREAT BLD: ABNORMAL (ref 9–20)
CALCIUM SERPL-MCNC: 9.4 MG/DL (ref 8.6–10.4)
CHLORIDE BLD-SCNC: 103 MMOL/L (ref 98–107)
CO2: 19 MMOL/L (ref 20–31)
COLOR: YELLOW
COMMENT UA: NORMAL
CREAT SERPL-MCNC: 0.58 MG/DL (ref 0.5–0.9)
DIFFERENTIAL TYPE: ABNORMAL
EOSINOPHILS RELATIVE PERCENT: 0 % (ref 1–4)
GFR AFRICAN AMERICAN: >60 ML/MIN
GFR NON-AFRICAN AMERICAN: >60 ML/MIN
GFR SERPL CREATININE-BSD FRML MDRD: ABNORMAL ML/MIN/{1.73_M2}
GFR SERPL CREATININE-BSD FRML MDRD: ABNORMAL ML/MIN/{1.73_M2}
GLUCOSE BLD-MCNC: 126 MG/DL (ref 70–99)
GLUCOSE URINE: NEGATIVE
HCT VFR BLD CALC: 44.3 % (ref 36.3–47.1)
HEMOGLOBIN: 14.1 G/DL (ref 11.9–15.1)
HEPATITIS C ANTIBODY: NONREACTIVE
IMMATURE GRANULOCYTES: 0 %
KETONES, URINE: NEGATIVE
LEUKOCYTE ESTERASE, URINE: NEGATIVE
LYMPHOCYTES # BLD: 5 % (ref 24–44)
MCH RBC QN AUTO: 29.7 PG (ref 25.2–33.5)
MCHC RBC AUTO-ENTMCNC: 31.8 G/DL (ref 28.4–34.8)
MCV RBC AUTO: 93.5 FL (ref 82.6–102.9)
MONOCYTES # BLD: 8 % (ref 1–7)
MORPHOLOGY: NORMAL
NITRITE, URINE: NEGATIVE
NRBC AUTOMATED: 0 PER 100 WBC
PDW BLD-RTO: 12.4 % (ref 11.8–14.4)
PH UA: 5.5 (ref 5–8)
PLATELET # BLD: 260 K/UL (ref 138–453)
PLATELET ESTIMATE: ABNORMAL
PMV BLD AUTO: 10.7 FL (ref 8.1–13.5)
POTASSIUM SERPL-SCNC: 4 MMOL/L (ref 3.7–5.3)
PROTEIN UA: NEGATIVE
RBC # BLD: 4.74 M/UL (ref 3.95–5.11)
RBC # BLD: ABNORMAL 10*6/UL
SEG NEUTROPHILS: 87 % (ref 36–66)
SEGMENTED NEUTROPHILS ABSOLUTE COUNT: 11.04 K/UL (ref 1.8–7.7)
SODIUM BLD-SCNC: 140 MMOL/L (ref 135–144)
SPECIFIC GRAVITY UA: 1.01 (ref 1–1.03)
TOTAL PROTEIN: 7.7 G/DL (ref 6.4–8.3)
TSH SERPL DL<=0.05 MIU/L-ACNC: 0.85 MIU/L (ref 0.3–5)
TURBIDITY: CLEAR
URINE HGB: NEGATIVE
UROBILINOGEN, URINE: NORMAL
WBC # BLD: 12.7 K/UL (ref 3.5–11.3)
WBC # BLD: ABNORMAL 10*3/UL

## 2021-10-20 PROCEDURE — 99396 PREV VISIT EST AGE 40-64: CPT | Performed by: FAMILY MEDICINE

## 2021-10-20 SDOH — ECONOMIC STABILITY: FOOD INSECURITY: WITHIN THE PAST 12 MONTHS, THE FOOD YOU BOUGHT JUST DIDN'T LAST AND YOU DIDN'T HAVE MONEY TO GET MORE.: NEVER TRUE

## 2021-10-20 SDOH — ECONOMIC STABILITY: FOOD INSECURITY: WITHIN THE PAST 12 MONTHS, YOU WORRIED THAT YOUR FOOD WOULD RUN OUT BEFORE YOU GOT MONEY TO BUY MORE.: NEVER TRUE

## 2021-10-20 ASSESSMENT — SOCIAL DETERMINANTS OF HEALTH (SDOH): HOW HARD IS IT FOR YOU TO PAY FOR THE VERY BASICS LIKE FOOD, HOUSING, MEDICAL CARE, AND HEATING?: NOT HARD AT ALL

## 2021-10-20 ASSESSMENT — PATIENT HEALTH QUESTIONNAIRE - PHQ9
SUM OF ALL RESPONSES TO PHQ QUESTIONS 1-9: 0
1. LITTLE INTEREST OR PLEASURE IN DOING THINGS: 0
2. FEELING DOWN, DEPRESSED OR HOPELESS: 0
SUM OF ALL RESPONSES TO PHQ QUESTIONS 1-9: 0
SUM OF ALL RESPONSES TO PHQ9 QUESTIONS 1 & 2: 0
SUM OF ALL RESPONSES TO PHQ QUESTIONS 1-9: 0

## 2021-10-20 NOTE — PROGRESS NOTES
Subjective:       Maribel Shah is a 45 y.o. female and is here for a comprehensive physical exam.  The patient reports no problems. Was treated by urgent care for poison ivy.  History:  Any STD's in the past? none  Patient's medications, allergies, past medical, surgical, social and family histories were reviewed and updated as appropriate. Do you take any herbs or supplements that were not prescribed by a doctor? no  Are you taking calcium supplements? not applicable  Are you taking aspirin daily? not applicable    Review of Systems  Do you have pain that bothers you in your daily life? no  Review of Systems   Constitutional: Negative for fever and unexpected weight change. Pertinent items are noted in HPI. Objective:   HENT:   /81   Pulse 103   Temp 98.1 °F (36.7 °C)   Ht 5' 5\" (1.651 m)   Wt 149 lb (67.6 kg)   SpO2 99%   BMI 24.79 kg/m²   Constitutional: Alert and oriented. Well-nourished. No distress. Head: Normocephalic and atraumatic. Right Ear: External ear normal. TM: no bulging, erythema or fluid seen. Left Ear: External ear normal. TM: no bulging, erythema or fluid seen. Nose: Nose normal.   Mouth/Throat: Oropharynx is clear and moist. teeth in good repair. Eyes: Pupils are equal, round, and reactive to light. Right eye exhibits no discharge. Left eye exhibits no discharge. No scleral icterus. Neck: Normal range of motion. Neck supple. No JVD present. No tracheal deviation present. No thyromegaly present. Cardiovascular: Normal rate, regular rhythm, normal heart sounds. Pulmonary/Chest: Effort normal and breath sounds normal. No respiratory distress. She has no wheezes. She has no rales. Abdominal: Soft. Bowel sounds are normal.  She exhibits no distension and no mass. There is no tenderness. There is no rebound and no guarding. Musculoskeletal: Normal range of motion. She exhibits no edema or tenderness.    Lymphadenopathy:    She has no cervical adenopathy. Neurological:  She is alert and oriented to person, place, and time. Cranial nerves grossly intact. No sensation problem noted. Muscle strength 5/5 throughout. Skin: Skin is warm and dry. No rash noted. No erythema. Psychiatric:  She has a normal mood and affect. Behavior is normal.    Mamdaou Martins was seen today for check-up. Diagnoses and all orders for this visit:    Encounter for general adult medical examination without abnormal findings  -     Cologuard; Future  -     CBC Auto Differential; Future  -     Urinalysis; Future  -     TSH with Reflex; Future  -     Comprehensive Metabolic Panel; Future  -     Hepatitis C Antibody; Future    Colon cancer screening  -     Cologuard; Future    Need for hepatitis C screening test  -     Hepatitis C Antibody; Future    no concerns. Needs to have a new cologuard - last one . Call or return to clinic prn if these symptoms worsen or fail to improve as anticipated. I have reviewed the instructions with the patient, answering all questions to her satisfaction. Patient Counseling:  --Nutrition: Stressed importance of moderation in sodium/caffeine intake, saturated fat and cholesterol, caloric balance, sufficient intake of fresh fruits, vegetables, fiber, calcium, iron, and 1 mg of folate supplement per day (for females capable of pregnancy). --Exercise: Stressed the importance of regular exercise. --Substance Abuse: Discussed cessation/primary prevention of tobacco, alcohol, or other drug use; driving or other dangerous activities under the influence; availability of treatment for abuse. --Sexuality: Discussed sexually transmitted diseases, partner selection, use of condoms, avoidance of unintended pregnancy  and contraceptive alternatives. --Injury prevention: Discussed safety belts, safety helmets, smoke detector, smoking near bedding or upholstery.    --Dental health: Discussed importance of regular tooth brushing, flossing, and dental visits. --Immunizations reviewed. --Discussed benefits of screening colonoscopy. --After hours service discussed with patient    Maliha Loyd received counseling on the following healthy behaviors: nutrition, exercise and medication adherence  Reviewed prior labs and health maintenance. Continue current medications, diet and exercise. Discussed use, benefit, and side effects of prescribed medications. Barriers to medication compliance addressed. Patient given educational materials - see patient instructions. All patient questions answered. Patient voiced understanding.       Follow up in one year     (Please note that portions of this note were completed with a voice recognition program. Efforts were made to edit the dictations but occasionally words are mis-transcribed.)

## 2021-10-25 ENCOUNTER — TELEPHONE (OUTPATIENT)
Dept: FAMILY MEDICINE CLINIC | Age: 51
End: 2021-10-25

## 2021-10-25 RX ORDER — VALACYCLOVIR HYDROCHLORIDE 1 G/1
1000 TABLET, FILM COATED ORAL 2 TIMES DAILY
Qty: 20 TABLET | Refills: 0 | Status: SHIPPED | OUTPATIENT
Start: 2021-10-25 | End: 2021-10-29

## 2021-10-25 NOTE — TELEPHONE ENCOUNTER
[facility] Please make sure the patient was started today. I know the patient has an appointment tomorrow with her dermatologist.  Call anytime if there is anything else needed. Thank you.

## 2021-12-08 DIAGNOSIS — Z12.11 COLON CANCER SCREENING: ICD-10-CM

## 2021-12-08 DIAGNOSIS — Z00.00 ENCOUNTER FOR GENERAL ADULT MEDICAL EXAMINATION WITHOUT ABNORMAL FINDINGS: ICD-10-CM

## 2022-02-24 ENCOUNTER — TELEPHONE (OUTPATIENT)
Dept: FAMILY MEDICINE CLINIC | Age: 52
End: 2022-02-24

## 2022-02-24 NOTE — TELEPHONE ENCOUNTER
Patient Called [facility] stated that she has lump on her left shin for about two months no pain just starting to worry her that it has been there for that long no injury or trauma that she is aware of. She states feels like a cyst. Please advise.  Thank you

## 2022-06-26 ENCOUNTER — PATIENT MESSAGE (OUTPATIENT)
Dept: FAMILY MEDICINE CLINIC | Age: 52
End: 2022-06-26

## 2022-06-26 DIAGNOSIS — N95.9 POSTMENOPAUSAL SYMPTOMS: Primary | ICD-10-CM

## 2022-06-27 NOTE — TELEPHONE ENCOUNTER
From: Rigo Naranjo  To: Dr. Macie Pickering  Sent: 6/26/2022 11:48 AM EDT  Subject: Pre Appointment Lab Work    I have an upcoming appointment on July 6. I have been experiencing multiple menopause symptoms. I am wondering if having lab work done to test hormone/estrogen levels prior to my appointment would be beneficial so they can be discussed at my appointment for possible treatment.       Thank You   Radha Higgins

## 2022-06-28 ENCOUNTER — HOSPITAL ENCOUNTER (OUTPATIENT)
Age: 52
Setting detail: SPECIMEN
Discharge: HOME | End: 2022-06-28

## 2022-06-28 DIAGNOSIS — N95.9 POSTMENOPAUSAL SYMPTOMS: ICD-10-CM

## 2022-06-28 LAB
FOLLICLE STIMULATING HORMONE: 83.1 MIU/ML (ref 1.7–21.5)
LH: 49.1 MIU/ML (ref 1–95.6)

## 2022-07-06 ENCOUNTER — HOSPITAL ENCOUNTER (OUTPATIENT)
Age: 52
Setting detail: SPECIMEN
Discharge: HOME | End: 2022-07-06

## 2022-07-06 ENCOUNTER — OFFICE VISIT (OUTPATIENT)
Dept: FAMILY MEDICINE CLINIC | Age: 52
End: 2022-07-06
Payer: COMMERCIAL

## 2022-07-06 VITALS
OXYGEN SATURATION: 99 % | HEART RATE: 80 BPM | WEIGHT: 155.8 LBS | DIASTOLIC BLOOD PRESSURE: 91 MMHG | HEIGHT: 65 IN | BODY MASS INDEX: 25.96 KG/M2 | SYSTOLIC BLOOD PRESSURE: 142 MMHG

## 2022-07-06 DIAGNOSIS — Z12.31 ENCOUNTER FOR SCREENING MAMMOGRAM FOR MALIGNANT NEOPLASM OF BREAST: ICD-10-CM

## 2022-07-06 DIAGNOSIS — Z12.4 ENCOUNTER FOR PAPANICOLAOU SMEAR FOR CERVICAL CANCER SCREENING: Primary | ICD-10-CM

## 2022-07-06 DIAGNOSIS — N95.9 POSTMENOPAUSAL SYMPTOMS: ICD-10-CM

## 2022-07-06 DIAGNOSIS — Z13.1 DIABETES MELLITUS SCREENING: ICD-10-CM

## 2022-07-06 DIAGNOSIS — Z12.4 ENCOUNTER FOR PAPANICOLAOU SMEAR FOR CERVICAL CANCER SCREENING: ICD-10-CM

## 2022-07-06 LAB — HBA1C MFR BLD: 5.7 %

## 2022-07-06 PROCEDURE — 99396 PREV VISIT EST AGE 40-64: CPT | Performed by: FAMILY MEDICINE

## 2022-07-06 PROCEDURE — 99213 OFFICE O/P EST LOW 20 MIN: CPT | Performed by: FAMILY MEDICINE

## 2022-07-06 PROCEDURE — 83036 HEMOGLOBIN GLYCOSYLATED A1C: CPT | Performed by: FAMILY MEDICINE

## 2022-07-06 RX ORDER — ESTROGEN,CON/M-PROGEST ACET 0.3-1.5MG
1 TABLET ORAL DAILY
Qty: 28 TABLET | Refills: 3 | Status: SHIPPED
Start: 2022-07-06 | End: 2022-11-03 | Stop reason: CLARIF

## 2022-07-06 ASSESSMENT — PATIENT HEALTH QUESTIONNAIRE - PHQ9
SUM OF ALL RESPONSES TO PHQ9 QUESTIONS 1 & 2: 0
SUM OF ALL RESPONSES TO PHQ QUESTIONS 1-9: 0
SUM OF ALL RESPONSES TO PHQ QUESTIONS 1-9: 0
2. FEELING DOWN, DEPRESSED OR HOPELESS: 0
SUM OF ALL RESPONSES TO PHQ QUESTIONS 1-9: 0
1. LITTLE INTEREST OR PLEASURE IN DOING THINGS: 0
SUM OF ALL RESPONSES TO PHQ QUESTIONS 1-9: 0

## 2022-07-06 NOTE — PROGRESS NOTES
Subjective: This 45 y.o. woman comes in today for pap smear only. Her most recent annual exam was on 05/2021. Her most recent Pap smear was on 05/2021 and showed ASCUS with POSITIVE high risk HPV. The patient did have a colpo done which was normal.  Previous abnormal Pap smears: no Contraception: none. Complains about postmenopausal symptoms including hot flashes headaches insomnia. She would like to start a hormone replacement therapy. Objective:     BP (!) 142/91   Pulse 80   Ht 5' 5\" (1.651 m)   Wt 155 lb 12.8 oz (70.7 kg)   SpO2 99%   BMI 25.93 kg/m²   Pelvic Exam: cervix normal in appearance, external genitalia normal, no adnexal masses or tenderness, no bladder tenderness, no cervical motion tenderness, urethra without abnormality or discharge, uterus normal size, shape, and consistency, vagina normal without discharge. Pap smear obtained. Robert Ovalle was seen today for gynecologic exam.    Diagnoses and all orders for this visit:    Encounter for Papanicolaou smear for cervical cancer screening    Diabetes mellitus screening  -     POCT glycosylated hemoglobin (Hb A1C)    Encounter for screening mammogram for malignant neoplasm of breast  -     PRISCILA DIGITAL SCREEN W OR WO CAD BILATERAL; Future    Postmenopausal symptoms  -     estrogen, conjugated,-medroxyPROGESTERone (PREMPRO) 0.3-1.5 MG per tablet; Take 1 tablet by mouth daily    Overall the patient is doing well. I discussed with her in detail that hormonal treatment has side effects including stroke heart attacks elevated blood pressure. Patient states because of her symptoms are so severe she would like to try a hormonal replacement. The patient has still her uterine place and that is why the progesterone was added. Patient will call with any changes. Call or return to clinic prn if these symptoms worsen or fail to improve as anticipated.   I have reviewed the instructions with the patient, answering all questions to her satisfaction.     (Please note that portions of this note were completed with a voice recognition program. Efforts were made to edit the dictations but occasionally words are mis-transcribed.)

## 2022-07-07 LAB
CANDIDA SPECIES, DNA PROBE: NEGATIVE
GARDNERELLA VAGINALIS, DNA PROBE: NEGATIVE
SOURCE: NORMAL
TRICHOMONAS VAGINALIS DNA: NEGATIVE

## 2022-07-12 ENCOUNTER — PATIENT MESSAGE (OUTPATIENT)
Dept: FAMILY MEDICINE CLINIC | Age: 52
End: 2022-07-12

## 2022-07-12 LAB — CYTOLOGY REPORT: NORMAL

## 2022-07-13 NOTE — TELEPHONE ENCOUNTER
From: Africa Lange  To: Dr. Shreya Yo  Sent: 7/12/2022 4:39 PM EDT  Subject: Pap Smear Results    I recieved my results and it states my thin prep is negative. Does a negative thin prep mean I am negative for ASCUS?

## 2022-07-29 ENCOUNTER — PATIENT MESSAGE (OUTPATIENT)
Dept: FAMILY MEDICINE CLINIC | Age: 52
End: 2022-07-29

## 2022-07-29 DIAGNOSIS — N93.9 VAGINA BLEEDING: Primary | ICD-10-CM

## 2022-07-30 NOTE — TELEPHONE ENCOUNTER
From: Иван Mckeon  To: Dr. Radha Muse  Sent: 7/29/2022 5:00 PM EDT  Subject: Prempro    Good afternoon, I was recently prescribed Prempro and have begun taking it. I am post menopausal but have started bleeding. Is this possibly from the medication? Should I continue to take it or would a cream be a safer and better choice?  Thank you and have a great weekend

## 2022-08-24 ENCOUNTER — HOSPITAL ENCOUNTER (OUTPATIENT)
Dept: ULTRASOUND IMAGING | Facility: CLINIC | Age: 52
Discharge: HOME | End: 2022-08-26
Payer: COMMERCIAL

## 2022-08-24 DIAGNOSIS — N93.9 VAGINA BLEEDING: ICD-10-CM

## 2022-08-24 PROCEDURE — 76830 TRANSVAGINAL US NON-OB: CPT

## 2022-09-06 NOTE — PROGRESS NOTES
Dermatology Patient Note  [address]  [address] [facility] [address]  Dept: [phone]  Dept Fax: [phone]      VISITDATE: 9/13/2022   REFERRING PROVIDER: No ref. provider found      Don Blandon is a 45 y.o. female  who presents today in the office for:    Follow-up (Hx of BCC FBSE pt has no concerns today )      HISTORY OF PRESENT ILLNESS:  As above.      MEDICAL PROBLEMS:  Patient Active Problem List    Diagnosis Date Noted    History of basal cell carcinoma 08/19/2021    Chronic cough 03/08/2019    POTS (postural orthostatic tachycardia syndrome) 04/30/2018    Fatigue 04/30/2018    Neck stiffness 04/30/2018       CURRENT MEDICATIONS:   Current Outpatient Medications   Medication Sig Dispense Refill    estrogen, conjugated,-medroxyPROGESTERone (PREMPRO) 0.3-1.5 MG per tablet Take 1 tablet by mouth daily 28 tablet 3    valACYclovir (VALTREX) 1 g tablet TAKE 2 TABLETS BY MOUTH TWO TIMES A DAY THE FIRST DAY THEN TAKE ONE TABLET BY MOUTH TWICE A DAY THEREAFTER (Patient not taking: No sig reported) 20 tablet 2    pyridostigmine (MESTINON) 60 MG tablet Take 60 mg by mouth 2 times daily  (Patient not taking: No sig reported)      fluticasone (FLONASE) 50 MCG/ACT nasal spray 2 sprays by Nasal route daily (Patient not taking: No sig reported) 1 Bottle 1     Current Facility-Administered Medications   Medication Dose Route Frequency Provider Last Rate Last Admin    lidocaine-EPINEPHrine 1 %-[id number] injection 0.5 mL  0.5 mL IntraDERmal Once Angelita Norman MD           ALLERGIES:   No Known Allergies    SOCIAL HISTORY:  Social History     Tobacco Use    Smoking status: Never    Smokeless tobacco: Never   Substance Use Topics    Alcohol use: No       Pertinent ROS:  Review of Systems  Skin: Denies any new changing, growing or bleeding lesions or rashes except as described in the HPI   Constitutional: Denies fevers, chills, and malaise. PHYSICAL EXAM:   /85   Pulse 88   Temp 96.8 °F (36 °C) (Temporal)   Ht 5' 5\" (1.651 m)   Wt 162 lb 12.8 oz (73.8 kg)   SpO2 99%   BMI 27.09 kg/m²     The patient is generally well appearing, well nourished, alert and conversational. Affect is normal.    Cutaneous Exam:  Physical Exam  Total body skin exam excluding external genitalia: head/face, neck, both arms, chest, back, abdomen, both legs, buttocks, digits and/or nails, was examined. Genital exam was deferred as patient denied having any lesions in this area. Complete visualization of scalp may be limited by hair density, length, and/or style. Examination of face was limited due to the patient wearing makeup. Facial covering was removed during examination. Diagnoses/exam findings/medical history pertinent to this visit are listed below:    Assessment:   Diagnosis Orders   1. History of basal cell carcinoma        2. Multiple nevi        3. Neoplasm of uncertain behavior of skin             Plan:  History of BCC  - well healed scar with no nodularity  - no evidence of recurrence     Neoplasm uncertain behavior of skin x 3  Ddx:  Left upper arm: nevus r/o atypia  Mid abdomen: nevus r/o atypia  Left upper abdomen: nevus r/o atypia  Shave Biopsy x 3: The procedure and its risks were explained including but not limited to pain, bleeding, infection, permanent scar, permanent pigment alteration and need for an additional procedure. Consent to proceed with the procedure was obtained from the patient or the parent. After cleaning with alcohol the lesions were anesthetized with 1% lidocaine with epinephrine and removed with a dermablade. Hemostasis was achieved with aluminum chloride and Vaseline and a bandage were applied.            Solar lentigo of shoulders  - patient was counseled that UV-damaged skin increases lifetime risk for skin cancer  - I recommended the patient apply broad spectrum spf 30+ sunscreen daily, reapplying every 2 hours.  - In additional to regular use of sunscreen, I recommended broad-rimmed hats, long sleeves, and seeking the shade. Multiple nevi  - Clinically and dermatoscopically benign on exam today. - Common nevi have a low individual risk of developing into melanoma. Patients with >50 nevi have a greater risk of developing melanoma in their lifetime and should undergo skin checks at least annually. - I recommended the patient apply broad spectrum spf 30+ sunscreen daily, reapplying every 2 hours  - In additional to regular use of sunscreen, I recommended broad-rimmed hats, long sleeves, and seeking the shade. History of atypical nevi  - annual skin checks    RTC 1 year    Future Appointments   Date Time Provider Vivienne Jett   10/6/2022 10:30 AM DO [id number]         There are no Patient Instructions on file for this visit. Cherelle Michael, personally scribed the services dictated to me by Dr. Conny Schirmer in this documentation. I, Dr. Conny Schirmer, personally performed the services described in this documentation, as scribed by Natalie Traylor in my presence, and it is both accurate and complete.     Electronically signed by Steven Mullen MD on 9/13/2022 at 10:44 PM

## 2022-09-13 ENCOUNTER — OFFICE VISIT (OUTPATIENT)
Dept: DERMATOLOGY | Age: 52
End: 2022-09-13
Payer: COMMERCIAL

## 2022-09-13 ENCOUNTER — HOSPITAL ENCOUNTER (OUTPATIENT)
Age: 52
Setting detail: SPECIMEN
Discharge: HOME | End: 2022-09-13

## 2022-09-13 VITALS
HEART RATE: 88 BPM | TEMPERATURE: 96.8 F | SYSTOLIC BLOOD PRESSURE: 125 MMHG | OXYGEN SATURATION: 99 % | WEIGHT: 162.8 LBS | BODY MASS INDEX: 27.12 KG/M2 | HEIGHT: 65 IN | DIASTOLIC BLOOD PRESSURE: 85 MMHG

## 2022-09-13 DIAGNOSIS — L81.4 LENTIGO: ICD-10-CM

## 2022-09-13 DIAGNOSIS — Z85.828 HISTORY OF BASAL CELL CARCINOMA: Primary | ICD-10-CM

## 2022-09-13 DIAGNOSIS — Z87.898 HISTORY OF ATYPICAL NEVUS: ICD-10-CM

## 2022-09-13 DIAGNOSIS — D48.5 NEOPLASM OF UNCERTAIN BEHAVIOR OF SKIN: ICD-10-CM

## 2022-09-13 DIAGNOSIS — D22.9 MULTIPLE NEVI: ICD-10-CM

## 2022-09-13 PROCEDURE — 11102 TANGNTL BX SKIN SINGLE LES: CPT | Performed by: DERMATOLOGY

## 2022-09-13 PROCEDURE — 11103 TANGNTL BX SKIN EA SEP/ADDL: CPT | Performed by: DERMATOLOGY

## 2022-09-13 PROCEDURE — 99213 OFFICE O/P EST LOW 20 MIN: CPT | Performed by: DERMATOLOGY

## 2022-09-13 RX ORDER — LIDOCAINE HYDROCHLORIDE 10 MG/ML
1 INJECTION, SOLUTION INFILTRATION; PERINEURAL ONCE
Status: SHIPPED | OUTPATIENT
Start: 2022-09-13

## 2022-09-13 NOTE — PATIENT INSTRUCTIONS
BIOPSY WOUND CARE    A biopsy is where a small piece of skin tissue is removed and examined by a pathologist.  When a biopsy is done, there is a small wound site that requires proper care to prevent infection and scarring. Some biopsies require sutures and their removal.    How to Care for Biopsy Wound    A.  Leave band-aid or dressing on for 24 hours. B. Wash two times a day with soap and water. C.  Let the wound air dry, then apply Vaseline ointment and cover with a Band-Aid       unless otherwise instructed by your provider. D. If there is slight discomfort, you may give acetaminophen or ibuprofen. When To Call the Doctor    Call the Dermatology Clinic or your doctor if any of the following occur:    A. Redness and swelling  B. Tenderness and warm to touch  C.  Drainage from wound  D. Fever    Biopsy Results    Biopsy results are usually available in 1-2 weeks. We provide biopsy results in letters or via [employer] for benign results or we will call for any concerning results. If you have not heard from our staff please call the office within 2 weeks. Please call our office with any concerns at [phone]. Sun Protection     There are two types of sun rays that are harmful to the skin. UVA rays cause skin aging and skin cancer, such as melanoma. UVB rays cause sunburns, cataracts, and also contribute to skin cancer. The American-Academy [employer] Dermat[employer] recommends that children and adults wear a broad spectrum, waterproof sunscreen with a Sun Protection Factor (SPF) of 30 or higher. It is important to check the ingredient label to be sure the sunscreen will protect the skin from both UVA and UVB sunrays. Your sunscreen should contain at least one of the following ingredients: titanium dioxide, zinc oxide, or avobenzone. Sunscreen will not be effective unless it is applied to all exposed skin. Sunscreens work best if they are applied 30 minutes before sun exposure.   They should be reapplied every 2 hours and after any water exposure. Sunscreen is not perfect. It is important to use other methods to protect the skin from sun exposure also. Wear hats, sunglasses and other sun protective clothing when outdoors. Stay in the shade during the peak hours of sun exposure between 10 AM and 4 PM.    Moles    Moles, or nevi, are very common. Moles are areas of the skin where there are more cells called melanocytes. Melanocytes are the cells in the body that produce pigment, or color. Moles can be many colors including skin-tone, pink, tan, brown, and very dark brown to black. Moles can be raised or flat. Moles can have hair. Moles can grow on any skin surface, including the scalp, hands and feet. When someone is born with a mole, or develops one in the first months of life, the mole is called a congenital, or birthmark mole. About 1 in 100 people are born with one or more moles. Most people develop their moles later in childhood or adulthood. These are called acquired moles. They are most common on sun exposed areas of skin such as the face, neck, upper body, arms and legs. CHECKING MOLES  Most moles are harmless, but in rare cases moles may become cancerous. Checking moles and looking for changes is an important step in helping to catch worrisome changes early. Some changes to look for are asymmetry (moles that do not look the same on each half), irregular shapes or borders, uneven color or large size. Also look for any moles that bleed, itch, or become painful. Looking at your skin regularly can help you recognize moles that are more at risk for becoming cancerous. WHEN TO CALL THE DOCTOR  Call your doctor if you see any of the following changes in a mole:       Irregular borders (uneven shape or edges)       Changes in color to black, blue or red.        Changes in the surface texture       Scabs, scaling, irritation or bleeding in the mole    TREATMENT FOR MOLES  Often we can simply look at your moles and tell you if they look worrisome. If we are not concerned about the look of your moles at your appointment, we may measure some moles and take some photos that will allow us to watch for future changes in the moles. TREATMENT FOR MOLES  If a mole is getting irritated frequently, bleeding, difficult to watch due to location or dark color, atypical in appearance, or worrisome, we may perform a skin biopsy. A skin biopsy is a procedure that involves removing the mole so that it can be looked at under a microscope. There are many methods used to remove moles. The method we choose depends on the location of the mole, the size of the mole, and the amount of concern for skin cancer. Generally, removing moles in the dermatologists office is a simple and safe procedure that can be done with local anesthesia. PREVENTION  You can do some things to prevent moles from becoming cancerous:       Try to avoid long periods of time in the sun and severe sunburns. The sun is        especially dangerous between 10:00 am and 4:00 pm.       Use a broad spectrum, water-resistant sun block lotion with an SPF of 30 or        greater. A broad spectrum lotion blocks both UVA and UVB rays from the sun. Re-apply sunscreen at least every 2 hours and after swimming or sweating. Take advantage of shade whenever possible. Wear a broad-brimmed hat,        sunglasses, and protective clothing when outdoors. Do not use tanning beds.

## 2022-09-16 LAB — DERMATOLOGY PATHOLOGY REPORT: NORMAL

## 2022-09-16 NOTE — RESULT ENCOUNTER NOTE
The biopsies showed an atypical nevi. The one on left abdomen and left upper arm are mildly atypical and are no more likely to turn into a melanoma than regular moles. The one on the mid abdomen is moderately atypical. Though it is not a melanoma, it should be completely removed with clear margins to ensure that the residual cells do not progress to melanoma. Please schedule for 1 hour procedure. Please discuss post-procedure limitations (no heavy lifting, pushing, or pulling).

## 2022-10-06 ENCOUNTER — OFFICE VISIT (OUTPATIENT)
Dept: OBGYN CLINIC | Age: 52
End: 2022-10-06
Payer: COMMERCIAL

## 2022-10-06 VITALS
BODY MASS INDEX: 26.66 KG/M2 | SYSTOLIC BLOOD PRESSURE: 148 MMHG | HEART RATE: 92 BPM | DIASTOLIC BLOOD PRESSURE: 110 MMHG | HEIGHT: 65 IN | WEIGHT: 160 LBS

## 2022-10-06 DIAGNOSIS — N95.0 POSTMENOPAUSAL BLEEDING: Primary | ICD-10-CM

## 2022-10-06 DIAGNOSIS — R93.89 THICKENED ENDOMETRIUM: ICD-10-CM

## 2022-10-06 PROCEDURE — 99203 OFFICE O/P NEW LOW 30 MIN: CPT | Performed by: STUDENT IN AN ORGANIZED HEALTH CARE EDUCATION/TRAINING PROGRAM

## 2022-10-07 NOTE — PROGRESS NOTES
Jamilah Murphy  10/6/2022    YOB: 1970    HPI:  Jamilah Murphy is a 45 y.o. female     The patient was seen and examined today. She is here regarding referral for abnormal findings on ultrasound. Patient had lab work completed by her PCP to see if she was in menopause. Her [address] was elevated 22. Due to having symptoms of menopause that were affecting her daily living, she was started on Prempro in July. She states once she started those hormones, she had an episode of bleeding. She immediately stopped the hormones and the bleeding subsided. She had a pelvic US completed 22 which showed her stripe to be thickened at 1 cm with a possible posterior 1 cm fibroid. Due these findings she was referred to gyn. Today, she has no complaints. Denies any additional episodes of bleeding. She does have a hx of abnormal pap smear (ASCUS with + HR HPV) but her most recent pap in 2022 was negative for cytology, HPV was not ordered. Patients  was also present for discussion. OB History    Para Term  AB Living   2 2 2 0 0 2   SAB IAB Ectopic Molar Multiple Live Births   0 0 0 0 0 2      # Outcome Date GA Lbr Macario/2nd Weight Sex Delivery Anes PTL Lv   2 Term      Vag-Spont   RJ   1 Term      Vag-Spont   RJ       Past Medical History:   Diagnosis Date    Chronic cough     Neurocardiogenic syncope     POTS (postural orthostatic tachycardia syndrome)     Wheezing        History reviewed. No pertinent surgical history.     Family History   Problem Relation Age of Onset    Cancer Father     Other Father        Social History     Socioeconomic History    Marital status:      Spouse name: Not on file    Number of children: Not on file    Years of education: Not on file    Highest education level: Not on file   Occupational History    Not on file   Tobacco Use    Smoking status: Never    Smokeless tobacco: Never   Vaping Use    Vaping Use: Never used Substance and Sexual Activity    Alcohol use: No    Drug use: No    Sexual activity: Yes     Comment: partner vasectomy   Other Topics Concern    Not on file   Social History Narrative    Not on file     Social Determinants of Health     Financial Resource Strain: Low Risk     Difficulty of Paying Living Expenses: Not hard at all   Food Insecurity: No Food Insecurity    Worried About Running Out of Food in the Last Year: Never true    Ran Out of Food in the Last Year: Never true   Transportation Needs: Not on file   Physical Activity: Not on file   Stress: Not on file   Social Connections: Not on file   Intimate Partner Violence: Not on file   Housing Stability: Not on file       MEDICATIONS:  Current Outpatient Medications   Medication Sig Dispense Refill    pyridostigmine (MESTINON) 60 MG tablet Take 60 mg by mouth 2 times daily      estrogen, conjugated,-medroxyPROGESTERone (PREMPRO) 0.3-1.5 MG per tablet Take 1 tablet by mouth daily (Patient not taking: Reported on 10/6/2022) 28 tablet 3     Current Facility-Administered Medications   Medication Dose Route Frequency Provider Last Rate Last Admin    lidocaine 1 % injection 1 mL  1 mL IntraDERmal Once Rosa Maria Sandoval MD        lidocaine-EPINEPHrine 1 %-[id number] injection 0.5 mL  0.5 mL IntraDERmal Once Rosa Maria Sandoval MD           ALLERGIES:  Allergies as of 10/06/2022    (No Known Allergies)       REVIEW OF SYSTEMS:    Constitutional: negative fever, negative chills, negative weight changes   HEENT: negative visual disturbances, negative headaches, negative dizziness   Breast: Negative breast abnormalities, negative breast lumps, negative nipple discharge  Respiratory: negative dyspnea, negative cough, negative SOB  Cardiovascular: negative chest pain,  negative palpitations, negative arrhythmia   Gastrointestinal: negative abdominal pain, negative N/V, negative bowel changes, negative heartburn   Genitourinary: negative dysuria, negative hematuria, negative urinary incontinence, negative vaginal discharge, pos post menopausal bleeding   Dermatological: negative rash, negative pruritis, negative mole changes  Hematologic: negative bruising  Immunologic/Lymphatic: negative recent illness, negative recent sick contact  Musculoskeletal: negative back pain, negative myalgias, negative arthralgias  Neurological:  negative dizziness, negative migraines, negative seizures   Behavior/Psych: negative depression, negative anxiety, negative SI, negative HI    VITALS:  Vitals:    10/06/22 1037 10/06/22 1121   BP: (!) 142/106 (!) 148/110   Site: Left Upper Arm Left Upper Arm   Position: Sitting Sitting   Cuff Size: Medium Adult Medium Adult   Pulse: 92    Weight: 160 lb (72.6 kg)    Height: 5' 5\" (1.651 m)        PHYSICAL EXAM:  General appearance: no apparent distress, alert and cooperative  HEENT: head atraumatic, normocephalic, trachea midline, moist mucous membranes   Neurologic: alert, oriented, normal speech, no focal findings or movement disorder noted  Lungs: no increased work of breathing   Abdomen: soft, non acute abdomen   Extremities:  no calf tenderness bilaterally, non-edematous bilaterally   Musculoskeletal: no gross abnormalities, range of motion appropriate for age   Psychiatric: mood appropriate, normal affect   Pelvic Exam:deferred until procedure      Assessment/Plan:    Postmenopausal Bleeding with Thickened Endometrial Lining    - VSS except for elevated BP, patient to follow up with PCP for management    - Due to abnormal findings on ultrasound, dicussed with patient that a biopsy would be indicated to confirm there is no hyperplasia, atypia or malignancy    - Offered to perform endometrial biopsy in the office or hysteroscopy D&C in the operating room. Lengthy discussion held about pro/con of each procedure. Ultimately patient decided on in office endometrial biopsy    - Will schedule for EMB       Diagnosis Orders   1. Postmenopausal bleeding        2. Thickened endometrium          Patient Active Problem List    Diagnosis Date Noted    History of basal cell carcinoma 08/19/2021    Chronic cough 03/08/2019    POTS (postural orthostatic tachycardia syndrome) 04/30/2018    Fatigue 04/30/2018    Neck stiffness 04/30/2018         Counseling:  Repeat Annual every 1 year  Cervical Cytology Evaluation begins at 24years old. If Negative Cytology, Follow-up screening per current guidelines. Counseled on preventative health maintenance follow-up. No orders of the defined types were placed in this encounter. Patient was seen with total face to face time of 40 minutes. More than 50% of this visit was counseling and education regarding The primary encounter diagnosis was Postmenopausal bleeding. A diagnosis of Thickened endometrium was also pertinent to this visit. and Establish Care (Last pap 7/6/22-WNL with PCP  last mammogram 5/11/21-WNL ) and Menstrual Problem (Started HRT Prempro for hot flashes took for a week started bleeding stopped taking & had US )   as well as counseling on preventative health maintenance follow-up.       DO Corina May OB/GY[facility]  10/6/2022, 8:19 PM

## 2022-11-03 ENCOUNTER — HOSPITAL ENCOUNTER (OUTPATIENT)
Age: 52
Setting detail: SPECIMEN
Discharge: HOME | End: 2022-11-03

## 2022-11-03 ENCOUNTER — PROCEDURE VISIT (OUTPATIENT)
Dept: OBGYN CLINIC | Age: 52
End: 2022-11-03
Payer: COMMERCIAL

## 2022-11-03 VITALS
HEIGHT: 65 IN | DIASTOLIC BLOOD PRESSURE: 102 MMHG | WEIGHT: 162.2 LBS | SYSTOLIC BLOOD PRESSURE: 152 MMHG | BODY MASS INDEX: 27.02 KG/M2

## 2022-11-03 DIAGNOSIS — N95.0 POSTMENOPAUSAL BLEEDING: Primary | ICD-10-CM

## 2022-11-03 DIAGNOSIS — R93.89 THICKENED ENDOMETRIUM: ICD-10-CM

## 2022-11-03 DIAGNOSIS — N95.2 VAGINAL ATROPHY: ICD-10-CM

## 2022-11-03 PROCEDURE — 58100 BIOPSY OF UTERUS LINING: CPT | Performed by: STUDENT IN AN ORGANIZED HEALTH CARE EDUCATION/TRAINING PROGRAM

## 2022-11-03 NOTE — PROGRESS NOTES
Chaperone for Intimate Exam  Chaperone was offered and accepted as part of the rooming process.   Chaperone: Luis Naranjo CMA

## 2022-11-03 NOTE — PROGRESS NOTES
[facility]   Endometrial Biopsy Procedure Note    Khurram Bautista  11/3/2022                       Primary Care Physician: Maryellen Tate MD      Subjective:   Khurram Bautista 47 y.o. female [id number] is here for previously scheduled EMB due to having an episode of post menopausal bleeding with thickened endometrial lining. She continues to not take the Prempro and denies any further episodes of bleeding. Her  is present for appointment. They do disclose that they are very sexually active. She is nervous to re start the Prempro and was wondering if there is anything else they can try to improve their sex life. They have only tried water based lubricant. Vitals:   Vitals:    11/03/22 1050 11/03/22 1132   BP: (!) 162/110 (!) 152/110   Site: Left Upper Arm Left Upper Arm   Position: Sitting Sitting   Cuff Size: Medium Adult Medium Adult   Weight: 162 lb 3.2 oz (73.6 kg)    Height: 5' 5\" (1.651 m)      Procedure: Endometrial biopsy    Indications: Postmenopausal bleeding, thickened endometrial lining      Procedure Details:     Chaperone for Intimate Exam: Chaperone was present  for entire exam, Chaperone Name: Madeline Dutta MA     The patient was counseled on the procedure. Risks, benefits and alternatives were reviewed. The patient is aware that this is diagnostic and not curative and a second procedure may be needed. A consent was reviewed and obtained. The patient was positioned comfortably on the exam table. After a bimanual exam; the uterus was found to be  retroverted. A sterile speculum was placed into the vagina and the cervix was identified. It was cleansed with betadine. Cervical os was very stenotic. Allis clamp was used to stabilize the cervix. 3 dilators were used to dilate her os in order to get aspirator through. She tolerated dilation well. The aspirator was then gently passed into the endometrial cavity. Tissue was obtained and sent to pathology.  Allis clamp was removed and hemostasis noted from clamp site and cervical os. The patient tolerated the procedure well. Post procedure restrictions were reviewed and given to the patient. All counts and instruments were correct at the end of the procedure. ASSESSMENT:  Ros Stein 47 y.o. female [id number] with thickened endometrial lining and post menopausal bleeding    - VSS except for elevated BP. Patient to follow up with PCP for management    - EMB was able to be successfully performed today   - Motrin/tylenol for pain control PRN    - Follow up to discuss results in 1-2 weeks     Vaginal Atrophy    - Discussed foreplay, using moisturizers 2-3x per week on days they are not having sex, trying silicone based lubricant if they were unhappy with water based    - Also discussed that she might be a candidate for [address] procedure. She seemed interested in this. Referral to Dr Kathryn Espinal ([facility]) placed to establish care     Patient Active Problem List    Diagnosis Date Noted    History of basal cell carcinoma 08/19/2021    Chronic cough 03/08/2019    POTS (postural orthostatic tachycardia syndrome) 04/30/2018    Fatigue 04/30/2018    Neck stiffness 04/30/2018       COUNSELING:  The patient was counseled on follow up and home care with restrictions noted. Post procedure restrictions were reviewed and given to the patient. She was instructed to use barrier protection for sexually transmitted disease prevention as well as string checks/timing. She was instructed to abstain for two weeks and use martha-pads for the first 8 weeks post procedure. She is to notify the office or go to the nearest Emergency Department if she experiences Abdominal Pain, Temperatures more than 100.4 F, Odiferous Vaginal Discharge, Dizziness or Shortness of breath.     DO Corina Rooney  11/3/2022, 2:40 PM

## 2022-11-07 LAB — SURGICAL PATHOLOGY REPORT: NORMAL

## 2022-11-08 ENCOUNTER — HOSPITAL ENCOUNTER (OUTPATIENT)
Age: 52
Setting detail: SPECIMEN
Discharge: HOME | End: 2022-11-08

## 2022-11-08 ENCOUNTER — PROCEDURE VISIT (OUTPATIENT)
Dept: DERMATOLOGY | Age: 52
End: 2022-11-08
Payer: COMMERCIAL

## 2022-11-08 VITALS — HEART RATE: 70 BPM | DIASTOLIC BLOOD PRESSURE: 89 MMHG | SYSTOLIC BLOOD PRESSURE: 145 MMHG | OXYGEN SATURATION: 98 %

## 2022-11-08 DIAGNOSIS — D22.9 ATYPICAL NEVUS: Primary | ICD-10-CM

## 2022-11-08 PROCEDURE — 12032 INTMD RPR S/A/T/EXT 2.6-7.5: CPT | Performed by: DERMATOLOGY

## 2022-11-08 PROCEDURE — 11401 EXC TR-EXT B9+MARG 0.6-1 CM: CPT | Performed by: DERMATOLOGY

## 2022-11-08 RX ORDER — LIDOCAINE HYDROCHLORIDE AND EPINEPHRINE 10; 10 MG/ML; UG/ML
5 INJECTION, SOLUTION INFILTRATION; PERINEURAL ONCE
Status: SHIPPED | OUTPATIENT
Start: 2022-11-08

## 2022-11-08 NOTE — PROGRESS NOTES
Dermatology Surgery Pre-Visit Checklist    (Please complete with  Y (yes) / N (no) or explain)    Pathologic Diagnosis: Moderately atypical mole    Photo available of surgery site in media: yes    Competent to give informed consent?  Yes   If not, who is authorized to do so: NA    Need for help for wound care: No   If so, who will do so: NA    Are you diagnosed:   Diabetes: no   If yes, last A1C: NA       HIV: no       Hepatitis: no       Current smoker: no  If yes, how much: NA    So you have any of the following: Pacemaker: no       Defibrillator: no       Artificial Heart valve: no                Artificial Joints: no       Other Implantable Device: no       Organ Transplant: no       Other: NA    Are you on blood thinners such as: Asprin: no       Warfarin/Coumadin: no       Other: no    Any allergies to the following:  Lidocaine: no       Iodine: no       Adhesive: no       Other: NA

## 2022-11-08 NOTE — PROGRESS NOTES
Dermatology Procedure Note   [address]  [address] [address]  Dept: [phone]  Dept Fax: [phone]      Procedure Date: 11/8/2022  Procedure Time: 12:57 PM    Procedure Practitioner: Kiana Morales MD    Procedure: Excision    Pre-Procedure Diagnosis: Atypical Nevus    Post-Procedure Diagnosis: Same as Pre-Procedure Diagnosis    Informed Consent: The procedure and its risks were explained including but not limited to pain, bleeding, infection, permanent scar, permanent pigment alteration and recurrence. Consent to proceed with the procedure was obtained from the patient or the parent by the practitioner    Time Out:  A time out was conducted immediately before starting the procedure that confirmed a final verification of the correct patient, correct procedure, and correct site. Procedure Details:  Excision and layered closure: The 6 mm lesion on the mid abdomen was cleaned with chlorhexidine and anesthetized with 1% lidocaine with epinephrine. The lesion was then excised in an elliptical fashion down to subcutaneous fat with a 2 mm margin for a total excised diameter of 10 mm. Hemostasis was then achieved with electrocautery. Due to tension on the defect, undermining was performed to allow for closure. The subcutaneous tissues were then brought together with 4-0 Vicryl. The epidermis was approximated with 5-0 gut . running sutures. Final layered closure was 30 mm. Vaseline and a bulky wound dressing was applied.     Procedure Performed By: Kiana Morales MD    Estimated Blood Loss: Minimal    Pathologic Specimen: H&E    Procedure Tolerance: Good    Complication(s): None    Electronically signed by Kiana Morales MD on 11/8/22 at 12:57 PM EST

## 2022-11-08 NOTE — PATIENT INSTRUCTIONS
Post-operative Instructions for Excision Surgery  1. Wash hands with antibacterial soap before beginning wound care. Remove the pressure bandage the morning after surgery. Apply Vaseline ointment over the counter with a Q-tip and/or gauze; cover with a clean band-aid 2-3 times daily UNTIL SUTURES OR STAPLES ARE REMOVED. The surgical area can get wet, unless otherwise directed. We recommend that you avoid direct water pressure on the surgical site during the healing process. Important: Keep the area moist with the ointment and covered at all times. Keeping the surgical site moist and covered will help to prevent scab formation. 2.  Wipe area with hydrogen peroxide on a Q-tip twice a day before applying ointment. 3.  For 2 days following surgery: No smoking, no ingestion of alcohol, no aspirin (unless prescribed as part of a cardiac or stroke regimen)  or NSAIDS (no Advil, Motrin, Aleve, Ibuprofen). Take only Tylenol (acetaminophen) for discomfort. Any blood-thinner or anticoagulation medication you regularly take should be continued at your prescribing doctors discretion. 4.  For facial and scalp surgery: Do not bend over for 2 days and use 2 pillows to sleep for 2 nights. 5. Please note: Swelling and/or bruising may be visible below the surgical site due to gravitys pull. This may be more evident after facial procedures. Some very mild drainage onto the band-aid may be a normal part of wound healing a few days after surgery. 6. Do not do any heavy lifting or workout exercises UNTIL SUTURES OR STAPLES ARE REMOVED. No sit-ups, jogging, running, free-weight lifting, swimming, racquet ball, tennis, aerobics, golf, bowling, or speed-walking. You may walk at a normal pace. You may resume these activities after your suture/staple removal appointment. 7.  If the procedure is in an area where you shave, do not shave within 2 inches of the surgical site.     8. MERON/Compression stocking instructions: If a MERON/Compression stocking is applied, leave it in place until the morning. Following the dressing change, reapply the stocking and leave it on during the day. Remove the stocking at bedtime. Continue to wear the stocking until the sutures are removed. 9. During your surgery, your surgeon may have used an underlying layer of sutures, which are normally absorbed by the body. In some instances, the suture material may not be absorbed and you may need to come back for a follow-up visit. If you notice an acne-like bump or pimple forming along or in the suture line, as late as 2-8 weeks after your surgery, it may be the absorbable suture not being absorbed, and we would like you to call the office and make an appointment. An additional result of the procedure may be a slight raising or puckering at the edges of the surgical area due to overzealous healing at the surgery site. This may occur several weeks after the sutures have been removed. If you notice this, please call our office and make an appointment. Additional comments or prescriptions:_____________________________________________________  Suture removal appointment Date & Time__________________________________________________  For emergencies, please call the office.

## 2022-11-10 LAB — DERMATOLOGY PATHOLOGY REPORT: NORMAL

## 2022-11-11 NOTE — RESULT ENCOUNTER NOTE
Please inform patient that atypical mole was completely removed. Let me know if he/she has any questions or concerns about healing.

## 2022-11-15 ENCOUNTER — HOSPITAL ENCOUNTER (OUTPATIENT)
Dept: MAMMOGRAPHY | Age: 52
Discharge: HOME | End: 2022-11-17
Payer: COMMERCIAL

## 2022-11-15 DIAGNOSIS — Z12.31 ENCOUNTER FOR SCREENING MAMMOGRAM FOR MALIGNANT NEOPLASM OF BREAST: ICD-10-CM

## 2022-11-15 PROCEDURE — 77067 SCR MAMMO BI INCL CAD: CPT

## 2023-01-16 ENCOUNTER — APPOINTMENT (OUTPATIENT)
Dept: GENERAL RADIOLOGY | Age: 53
End: 2023-01-16
Payer: COMMERCIAL

## 2023-01-16 ENCOUNTER — HOSPITAL ENCOUNTER (EMERGENCY)
Age: 53
Discharge: HOME | End: 2023-01-16
Attending: EMERGENCY MEDICINE
Payer: COMMERCIAL

## 2023-01-16 VITALS
TEMPERATURE: 98.2 F | BODY MASS INDEX: 26.66 KG/M2 | RESPIRATION RATE: 18 BRPM | WEIGHT: 160 LBS | DIASTOLIC BLOOD PRESSURE: 98 MMHG | HEIGHT: 65 IN | HEART RATE: 96 BPM | SYSTOLIC BLOOD PRESSURE: 146 MMHG | OXYGEN SATURATION: 98 %

## 2023-01-16 DIAGNOSIS — S93.401A SPRAIN OF RIGHT ANKLE, UNSPECIFIED LIGAMENT, INITIAL ENCOUNTER: Primary | ICD-10-CM

## 2023-01-16 PROCEDURE — 96372 THER/PROPH/DIAG INJ SC/IM: CPT

## 2023-01-16 PROCEDURE — 99284 EMERGENCY DEPT VISIT MOD MDM: CPT

## 2023-01-16 PROCEDURE — 6360000002 HC RX W HCPCS

## 2023-01-16 PROCEDURE — 6370000000 HC RX 637 (ALT 250 FOR IP)

## 2023-01-16 PROCEDURE — 73610 X-RAY EXAM OF ANKLE: CPT

## 2023-01-16 RX ORDER — ACETAMINOPHEN 325 MG/1
650 TABLET ORAL ONCE
Status: COMPLETED | OUTPATIENT
Start: 2023-01-16 | End: 2023-01-16

## 2023-01-16 RX ORDER — KETOROLAC TROMETHAMINE 30 MG/ML
30 INJECTION, SOLUTION INTRAMUSCULAR; INTRAVENOUS ONCE
Status: COMPLETED | OUTPATIENT
Start: 2023-01-16 | End: 2023-01-16

## 2023-01-16 RX ADMIN — KETOROLAC TROMETHAMINE 30 MG: 30 INJECTION, SOLUTION INTRAMUSCULAR; INTRAVENOUS at 19:53

## 2023-01-16 RX ADMIN — ACETAMINOPHEN 650 MG: 325 TABLET ORAL at 19:53

## 2023-01-16 ASSESSMENT — PAIN - FUNCTIONAL ASSESSMENT: PAIN_FUNCTIONAL_ASSESSMENT: 0-10

## 2023-01-16 ASSESSMENT — PAIN DESCRIPTION - LOCATION: LOCATION: ANKLE

## 2023-01-16 ASSESSMENT — PAIN SCALES - GENERAL
PAINLEVEL_OUTOF10: 5
PAINLEVEL_OUTOF10: 6

## 2023-01-16 ASSESSMENT — PAIN DESCRIPTION - ORIENTATION: ORIENTATION: RIGHT

## 2023-01-16 NOTE — Clinical Note
Khadar Askew was seen and treated in our emergency department on 1/16/2023. She may return to work on 01/17/2023. If you have any questions or concerns, please don't hesitate to call.       Yesy Castillo, APRN - CNP

## 2023-01-17 NOTE — ED PROVIDER NOTES
Team [address] ED  eMERGENCY dEPARTMENT eNCOUnter      Pt Name: Marcia Babcock  MRN: [id number]  [place] 1970  Date of evaluation: 2023  Provider: GALINDO Nicholson [id number]       Chief Complaint   Patient presents with    Ankle Pain     Right ankle pain. Slipped on deck and fell down stairs. No LOC or hitting head. HISTORY OF PRESENT ILLNESS  (Location/Symptom, Timing/Onset, Context/Setting, Quality, Duration, Modifying Factors, Severity.)   Marcia Babcock is a 47 y.o. female who presents to the emergency department accompanied by her  via private auto with complaint of right ankle pain. Patient states she slipped on her deck and twisted her ankle on stairs today. Patient denies hitting her head or losing consciousness. Patient has ice on the affected area upon arrival.  She did not take any pain medication prior to arrival      Nursing Notes were reviewed. ALLERGIES     Patient has no known allergies. CURRENT MEDICATIONS       Discharge Medication List as of 2023  8:31 PM        CONTINUE these medications which have NOT CHANGED    Details   pyridostigmine (MESTINON) 60 MG tablet Take 60 mg by mouth 2 times dailyHistorical Med             PAST MEDICAL HISTORY         Diagnosis Date    Chronic cough     Neurocardiogenic syncope     POTS (postural orthostatic tachycardia syndrome)     Wheezing        SURGICAL HISTORY           Procedure Laterality Date    ENDOMETRIAL BIOPSY  2022         FAMILY HISTORY           Problem Relation Age of Onset    Cancer Father     Other Father      Family Status   Relation Name Status    Mother  Alive    Father          SOCIAL HISTORY      reports that she has never smoked. She has never used smokeless tobacco. She reports that she does not drink alcohol and does not use drugs.     REVIEW OF SYSTEMS    (2-9 systems for level 4, 10 or more for level 5)   Review of Systems   Constitutional: Negative for chills, diaphoresis, fatigue and fever. Respiratory:  Negative for cough, chest tightness and shortness of breath. Cardiovascular:  Negative for chest pain and leg swelling. Gastrointestinal:  Negative for abdominal pain, diarrhea, nausea and vomiting. Genitourinary:  Negative for dysuria and flank pain. Musculoskeletal:  Negative for myalgias and neck pain. Skin:  Negative for pallor and rash. Neurological:  Negative for dizziness, weakness, light-headedness, numbness and headaches. Psychiatric/Behavioral:  Negative for sleep disturbance. Except as noted above the remainder of the review of systems was reviewed and negative. PHYSICAL EXAM    (up to 7 for level 4, 8 or more for level 5)     ED Triage Vitals [01/16/23 1852]   BP Temp Temp Source Heart Rate Resp SpO2 Height Weight   (!) 146/98 98.2 °F (36.8 °C) Temporal 96 18 98 % 5' 5\" (1.651 m) 160 lb (72.6 kg)     Physical Exam  Vitals and nursing note reviewed. Constitutional:       General: She is not in acute distress. Appearance: Normal appearance. She is normal weight. She is not ill-appearing, toxic-appearing or diaphoretic. HENT:      Head: Normocephalic and atraumatic. Right Ear: External ear normal.      Left Ear: External ear normal.      Nose: Nose normal. No congestion or rhinorrhea. Mouth/Throat:      Mouth: Mucous membranes are moist.      Pharynx: Oropharynx is clear. Eyes:      General: No scleral icterus. Right eye: No discharge. Left eye: No discharge. Extraocular Movements: Extraocular movements intact. Pupils: Pupils are equal, round, and reactive to light. Cardiovascular:      Rate and Rhythm: Normal rate and regular rhythm. Pulses: Normal pulses. Heart sounds: Normal heart sounds. No murmur heard. Pulmonary:      Effort: Pulmonary effort is normal. No respiratory distress. Breath sounds: Normal breath sounds. No stridor.  No wheezing, rhonchi or rales. Chest:      Chest wall: No tenderness. Abdominal:      General: Abdomen is flat. There is no distension. Palpations: Abdomen is soft. Tenderness: There is no abdominal tenderness. There is no guarding or rebound. Musculoskeletal:         General: No deformity or signs of injury. Normal range of motion. Cervical back: Normal range of motion and neck supple. Right lower leg: No edema. Left lower leg: No edema. Legs:    Lymphadenopathy:      Cervical: No cervical adenopathy. Skin:     General: Skin is warm and dry. Capillary Refill: Capillary refill takes less than 2 seconds. Coloration: Skin is not jaundiced or pale. Findings: No rash. Neurological:      General: No focal deficit present. Mental Status: She is alert and oriented to person, place, and time. Cranial Nerves: No cranial nerve deficit. Sensory: No sensory deficit. Motor: No weakness. Gait: Gait normal.   Psychiatric:         Mood and Affect: Mood normal.         Behavior: Behavior normal.         Thought Content: Thought content normal.           DIAGNOSTIC RESULTS       RADIOLOGY:   Non-plain film images such as CT, Ultrasound and MRI are read by the radiologist. Plain radiographic images are visualized and preliminarily interpreted by the emergency physician with the below findings:    Interpretation per the Radiologist below, if available at the time of this note:    XR ANKLE RIGHT (MIN 3 VIEWS)    Result Date: 1/16/2023  EXAMINATION: THREE XRAY VIEWS OF THE RIGHT ANKLE 1/16/2023 4:42 pm COMPARISON: None. HISTORY: ORDERING SYSTEM PROVIDED HISTORY: injury, swelling TECHNOLOGIST PROVIDED HISTORY: injury, swelling Reason for Exam: Pt states pain and swelling to right ankle today after a fall FINDINGS: Mineralization appears normal.  The ankle mortise is not widened. The talar dome is intact. There is lateral malleolar soft tissue swelling.   No fracture, dislocation or focal bone lesion is identified. Lateral malleolar soft tissue swelling with no acute fracture or dislocation. LABS:  Labs Reviewed - No data to display    All other labs were within normal range or not returned as of this dictation. EMERGENCY DEPARTMENT COURSE and DIFFERENTIAL DIAGNOSIS/MDM:   Vitals:    Vitals:    01/16/23 1852   BP: (!) 146/98   Pulse: 96   Resp: 18   Temp: 98.2 °F (36.8 °C)   TempSrc: Temporal   SpO2: 98%   Weight: 160 lb (72.6 kg)   Height: 5' 5\" (1.651 m)       MEDICATIONS GIVEN IN THE ED:  Medications   ketorolac (TORADOL) injection 30 mg (30 mg IntraMUSCular Given 1/16/23 1953)   acetaminophen (TYLENOL) tablet 650 mg (650 mg Oral Given 1/16/23 1953)       CLINICAL DECISION MAKING:  The patient presented alert with a nontoxic appearance and was seen in conjunction with Dr. Nataly Blankenship. DDx include acute fracture, dislocation, sprain      I will order three-view x-ray of right ankle. Toradol and Tylenol are ordered for patient comfort. See above radiology report. Ace wrap and air splint applied to right ankle. Post splint application check was performed by myself, right foot and ankle remained neurovascularly intact. Patient instructed to take Tylenol and Motrin at home as needed for pain and swelling. The patient was involved in his/her plan of care. The tests that were ordered were discussed with the patient. Any medications that may have been ordered were discussed with the patient. I have reviewed the patient's previous medical records. Labs and imaging were reviewed. Evaluation and treatment course in the ED, and plan of care upon discharge was discussed in length with the patient. Patient had no further questions prior to being discharged and was instructed to return to the ED for new or worsening symptoms. Care was provided during an unprecedented national emergency due to the novel coronavirus, Covid-19.      FINAL IMPRESSION      1. Sprain of right ankle, unspecified ligament, initial encounter            Problem List  Patient Active Problem List   Diagnosis Code    POTS (postural orthostatic tachycardia syndrome) G90. A    Fatigue R53.83    Neck stiffness M43.6    Chronic cough R05.3    History of basal cell carcinoma Z85.828         DISPOSITION/PLAN   DISPOSITION Decision To Discharge 01/16/2023 08:31:10 PM      PATIENT REFERRED TO:   Luciana Elam MD  [address] [place] [address]  [phone]    Schedule an appointment as soon as possible for a visit   As needed    [facility] ED  [address] [facility]  [phone]  Go to   If symptoms worsen, As needed    DISCHARGE MEDICATIONS:     Discharge Medication List as of 1/16/2023  8:31 PM              (Please note that portions of this note were completed with a voice recognition program.  Efforts were made to edit the dictations but occasionally words are mis-transcribed.)    GALINDO Morales CNP, APRN - CNP  01/18/23 0111

## 2023-01-17 NOTE — ED PROVIDER NOTES
eMERGENCY dEPARTMENT eNCOUnter   Independent Attestation     Pt Name: Peri Blankenship  MRN: [id number]  [place] 1970  Date of evaluation: 1/16/23     Peri Blankenship is a 47 y.o. female with CC: Ankle Pain (Right ankle pain. Slipped on deck and fell down stairs. No LOC or hitting head. )      This visit was performed by both a physician and an APC. I performed all aspects of the MDM as documented. Based on the medical record the care appears appropriate. I was personally available for consultation in the Emergency Department.     The care is provided during an unprecedented national emergency due to the novel coronavirus, Deepak Mccormick MD  Attending Emergency Physician                  Sean Aguiar MD  01/16/23 2042

## 2023-01-17 NOTE — DISCHARGE INSTRUCTIONS
Please use Tylenol and Motrin as needed for pain and swelling. See handout on RICE. PLEASE RETURN TO THE EMERGENCY DEPARTMENT IMMEDIATELY if your symptoms worsen in anyway or in 8-12 hours if not improved for re-evaluation. You should immediately return to the ER for symptoms such as increasing pain, bloody stool, fever, a feeling of passing out, light headed, dizziness, chest pain, shortness of breath, persistent nausea and/or vomiting, numbness or weakness to the arms or legs, coolness or color change of the arms or legs. Take your medication as indicated and prescribed. If you are given an antibiotic then, make sure you get the prescription filled and take the antibiotics until finished. Patriciaoscar Any!!!    From [address] and [address] [facility] Emergency Services    On behalf of the Emergency Department staff at [address], I would like to thank you for giving us the opportunity to address your health care needs and concerns. We hope that during your visit, our service was delivered in a professional and caring manner. Please keep [address] in mind as we walk with you down the path to your own personal wellness. Please expect an automated text message or email from us so we can ask a few questions about your health and progress. Based on your answers, a clinician may call you back to offer help and instructions. Please understand that early in the process of an illness or injury, an emergency department workup can be falsely reassuring. If you notice any worsening, changing or persistent symptoms please call your family doctor or return to the ER immediately.

## 2023-01-18 ASSESSMENT — ENCOUNTER SYMPTOMS
DIARRHEA: 0
VOMITING: 0
COUGH: 0
CHEST TIGHTNESS: 0
ABDOMINAL PAIN: 0
NAUSEA: 0
SHORTNESS OF BREATH: 0

## 2023-03-27 ENCOUNTER — PATIENT MESSAGE (OUTPATIENT)
Dept: FAMILY MEDICINE CLINIC | Age: 53
End: 2023-03-27

## 2023-03-27 NOTE — TELEPHONE ENCOUNTER
From: Marialuisa Alba  To: Dr. Sarah Hamilton  Sent: 3/27/2023 1:32 PM EDT  Subject: Ankle Injury    Good Afternoon, On 1/16/23 I fell and thought I had broken my ankle. Went to hospital and they stated it was just a severe sprain. I am still having pain and swelling in that ankle. I know I was told ankle sprains can take up to 8 wks to heal but I am past that and I am wondering what my next step should be. I have a family member that is a therapist and has me doing exercises and stretches but that has not healed it either. It is spring time and I am an outdoor person who loves to get out for walks but right now theyre karthikeyan like limps. LOL Just trying to figure out what to do.

## 2023-09-13 ENCOUNTER — OFFICE VISIT (OUTPATIENT)
Dept: DERMATOLOGY | Age: 53
End: 2023-09-13
Payer: COMMERCIAL

## 2023-09-13 VITALS
TEMPERATURE: 98.2 F | DIASTOLIC BLOOD PRESSURE: 82 MMHG | WEIGHT: 161 LBS | SYSTOLIC BLOOD PRESSURE: 125 MMHG | HEIGHT: 65 IN | OXYGEN SATURATION: 97 % | HEART RATE: 91 BPM | BODY MASS INDEX: 26.82 KG/M2

## 2023-09-13 DIAGNOSIS — D23.9 DERMATOFIBROMA: ICD-10-CM

## 2023-09-13 DIAGNOSIS — D18.01 CHERRY ANGIOMA: ICD-10-CM

## 2023-09-13 DIAGNOSIS — L81.4 SOLAR LENTIGO: ICD-10-CM

## 2023-09-13 DIAGNOSIS — Z87.898 HISTORY OF ATYPICAL NEVUS: ICD-10-CM

## 2023-09-13 DIAGNOSIS — Z85.828 HISTORY OF BASAL CELL CARCINOMA: Primary | ICD-10-CM

## 2023-09-13 DIAGNOSIS — D22.9 MULTIPLE NEVI: ICD-10-CM

## 2023-09-13 PROCEDURE — 3017F COLORECTAL CA SCREEN DOC REV: CPT | Performed by: DERMATOLOGY

## 2023-09-13 PROCEDURE — 99213 OFFICE O/P EST LOW 20 MIN: CPT | Performed by: DERMATOLOGY

## 2023-09-13 PROCEDURE — G8419 CALC BMI OUT NRM PARAM NOF/U: HCPCS | Performed by: DERMATOLOGY

## 2023-09-13 PROCEDURE — G8427 DOCREV CUR MEDS BY ELIG CLIN: HCPCS | Performed by: DERMATOLOGY

## 2023-09-13 PROCEDURE — 1036F TOBACCO NON-USER: CPT | Performed by: DERMATOLOGY

## 2023-09-13 NOTE — PROGRESS NOTES
Dermatology Patient Note  [address]  [address] [address]  Dept: [phone]  Dept Fax: [phone]      VISITDATE: 9/13/2023   REFERRING PROVIDER: No ref. provider found      Brittany Estrada is a 47 y.o. female  who presents today in the office for:    Other (1 year follow up- FBSE- Hx of BCC- no areas of concern )      HISTORY OF PRESENT ILLNESS:  As above. Patient has a history of BCC and atypical nevi. Shave biopsy taken at last visit, 9/13/22, of left upper arm, mid abdomen, and left upper abdomen all revealed dysplastic nevus. Excision recommended for mid abdomen as it was moderately atypical, remaining two were mildly atypical. Excision of atypical nevus on mid back on 11/8/22 with pathology confirmation. Today, patient is doing well with no concerns.      MEDICAL PROBLEMS:  Patient Active Problem List    Diagnosis Date Noted    History of basal cell carcinoma 08/19/2021    Chronic cough 03/08/2019    POTS (postural orthostatic tachycardia syndrome) 04/30/2018    Fatigue 04/30/2018    Neck stiffness 04/30/2018       CURRENT MEDICATIONS:   Current Outpatient Medications   Medication Sig Dispense Refill    pyridostigmine (MESTINON) 60 MG tablet Take 1 tablet by mouth as needed       Current Facility-Administered Medications   Medication Dose Route Frequency Provider Last Rate Last Admin    lidocaine-EPINEPHrine 1 %-1:340341 injection 5 mL  5 mL IntraDERmal Once Sultana Newsome MD        lidocaine 1 % injection 1 mL  1 mL IntraDERmal Once Sultana Newsome MD        lidocaine-EPINEPHrine 1 %-1:246024 injection 0.5 mL  0.5 mL IntraDERmal Once Sultana Newsome MD           ALLERGIES:   No Known Allergies    SOCIAL HISTORY:  Social History     Tobacco Use    Smoking status: Never    Smokeless tobacco: Never   Substance Use Topics    Alcohol use: No       Pertinent ROS:  Review of Systems  Skin: Denies

## 2023-09-13 NOTE — PATIENT INSTRUCTIONS
- no make up at next visit    Sun Protection     There are two types of sun rays that are harmful to the skin. UVA rays cause skin aging and skin cancer, such as melanoma. UVB rays cause sunburns, cataracts, and also contribute to skin cancer. The American-Academy of Dermatology recommends that children and adults wear a broad spectrum, waterproof sunscreen with a Sun Protection Factor (SPF) of 30 or higher. It is important to check the ingredient label to be sure the sunscreen will protect the skin from both UVA and UVB sunrays. Your sunscreen should contain at least one of the following ingredients: titanium dioxide, zinc oxide, or avobenzone. Sunscreen will not be effective unless it is applied to all exposed skin. Sunscreens work best if they are applied 30 minutes before sun exposure. They should be reapplied every 2 hours and after any water exposure. Sunscreen is not perfect. It is important to use other methods to protect the skin from sun exposure also. Wear hats, sunglasses and other sun protective clothing when outdoors. Stay in the shade during the peak hours of sun exposure between 10 AM and 4 PM.    Cherry angiomas  Red moles, or cherry angiomas, are common benign skin growths that can develop on most areas of your body. Dilan Noe also known as senile angiomas as they typically develop in people aged 27 and older. The collection of small blood vessels inside a cherry angioma give them a reddish appearance. Moles    Moles, or nevi, are very common. Moles are areas of the skin where there are more cells called melanocytes. Melanocytes are the cells in the body that produce pigment, or color. Moles can be many colors including skin-tone, pink, tan, brown, and very dark brown to black. Moles can be raised or flat. Moles can have hair. Moles can grow on any skin surface, including the scalp, hands and feet.  When someone is born with a mole, or develops one in the first months of life, the

## 2024-02-14 NOTE — TELEPHONE ENCOUNTER
Pt states she had itchy bumps on forehead and eye with face swelling that started a week ago. Pt went to urgent care last Tuesday and was given a steroid shot. Pt now has numbness and tingling on left side of face and top of the head that started Friday and was told she could have shingles. Please advise. 4 = No assist / stand by assistance

## 2024-12-26 ENCOUNTER — TELEPHONE (OUTPATIENT)
Dept: FAMILY MEDICINE CLINIC | Age: 54
End: 2024-12-26

## 2024-12-26 NOTE — TELEPHONE ENCOUNTER
Patient was seen at  on Saturday and has UTI and treated with Levofloxacin. She finishes the script tomorrow. She has no appetite, urine out is small. She reports to have had chills but reports not fever. No pain with urination or urgency. [facility]acy is [facility].  Please advise.

## 2025-01-11 SDOH — ECONOMIC STABILITY: FOOD INSECURITY: WITHIN THE PAST 12 MONTHS, YOU WORRIED THAT YOUR FOOD WOULD RUN OUT BEFORE YOU GOT MONEY TO BUY MORE.: NEVER TRUE

## 2025-01-11 SDOH — ECONOMIC STABILITY: INCOME INSECURITY: IN THE LAST 12 MONTHS, WAS THERE A TIME WHEN YOU WERE NOT ABLE TO PAY THE MORTGAGE OR RENT ON TIME?: NO

## 2025-01-11 SDOH — ECONOMIC STABILITY: FOOD INSECURITY: WITHIN THE PAST 12 MONTHS, THE FOOD YOU BOUGHT JUST DIDN'T LAST AND YOU DIDN'T HAVE MONEY TO GET MORE.: NEVER TRUE

## 2025-01-11 SDOH — ECONOMIC STABILITY: TRANSPORTATION INSECURITY
IN THE PAST 12 MONTHS, HAS LACK OF TRANSPORTATION KEPT YOU FROM MEETINGS, WORK, OR FROM GETTING THINGS NEEDED FOR DAILY LIVING?: NO

## 2025-01-11 SDOH — ECONOMIC STABILITY: TRANSPORTATION INSECURITY
IN THE PAST 12 MONTHS, HAS THE LACK OF TRANSPORTATION KEPT YOU FROM MEDICAL APPOINTMENTS OR FROM GETTING MEDICATIONS?: NO

## 2025-01-11 ASSESSMENT — PATIENT HEALTH QUESTIONNAIRE - PHQ9
2. FEELING DOWN, DEPRESSED OR HOPELESS: NOT AT ALL
SUM OF ALL RESPONSES TO PHQ9 QUESTIONS 1 & 2: 1
1. LITTLE INTEREST OR PLEASURE IN DOING THINGS: SEVERAL DAYS
SUM OF ALL RESPONSES TO PHQ QUESTIONS 1-9: 1
SUM OF ALL RESPONSES TO PHQ QUESTIONS 1-9: 1
2. FEELING DOWN, DEPRESSED OR HOPELESS: NOT AT ALL
SUM OF ALL RESPONSES TO PHQ QUESTIONS 1-9: 1
SUM OF ALL RESPONSES TO PHQ QUESTIONS 1-9: 1
SUM OF ALL RESPONSES TO PHQ9 QUESTIONS 1 & 2: 1
1. LITTLE INTEREST OR PLEASURE IN DOING THINGS: SEVERAL DAYS

## 2025-01-14 ENCOUNTER — OFFICE VISIT (OUTPATIENT)
Dept: FAMILY MEDICINE CLINIC | Age: 55
End: 2025-01-14
Payer: COMMERCIAL

## 2025-01-14 VITALS
HEART RATE: 90 BPM | OXYGEN SATURATION: 94 % | DIASTOLIC BLOOD PRESSURE: 97 MMHG | SYSTOLIC BLOOD PRESSURE: 150 MMHG | BODY MASS INDEX: 27.89 KG/M2 | WEIGHT: 167.6 LBS

## 2025-01-14 DIAGNOSIS — Z13.220 SCREENING FOR HYPERLIPIDEMIA: ICD-10-CM

## 2025-01-14 DIAGNOSIS — Z13.1 DIABETES MELLITUS SCREENING: ICD-10-CM

## 2025-01-14 DIAGNOSIS — Z13.6 ENCOUNTER FOR LIPID SCREENING FOR CARDIOVASCULAR DISEASE: ICD-10-CM

## 2025-01-14 DIAGNOSIS — Z12.12 SCREENING FOR COLORECTAL CANCER: ICD-10-CM

## 2025-01-14 DIAGNOSIS — Z00.00 ENCOUNTER FOR WELL ADULT EXAM WITHOUT ABNORMAL FINDINGS: Primary | ICD-10-CM

## 2025-01-14 DIAGNOSIS — Z12.31 ENCOUNTER FOR SCREENING MAMMOGRAM FOR BREAST CANCER: ICD-10-CM

## 2025-01-14 DIAGNOSIS — Z13.220 ENCOUNTER FOR LIPID SCREENING FOR CARDIOVASCULAR DISEASE: ICD-10-CM

## 2025-01-14 DIAGNOSIS — Z12.11 SCREENING FOR COLORECTAL CANCER: ICD-10-CM

## 2025-01-14 DIAGNOSIS — Z23 NEED FOR PROPHYLACTIC VACCINATION AND INOCULATION AGAINST VARICELLA: ICD-10-CM

## 2025-01-14 PROCEDURE — 99396 PREV VISIT EST AGE 40-64: CPT | Performed by: FAMILY MEDICINE

## 2025-01-14 RX ORDER — PREDNISONE 20 MG/1
20 TABLET ORAL DAILY
Qty: 7 TABLET | Refills: 0 | Status: SHIPPED | OUTPATIENT
Start: 2025-01-14 | End: 2025-01-21

## 2025-01-14 RX ORDER — ZOSTER VACCINE RECOMBINANT, ADJUVANTED 50 MCG/0.5
0.5 KIT INTRAMUSCULAR ONCE
Qty: 0.5 ML | Refills: 1 | Status: SHIPPED | OUTPATIENT
Start: 2025-01-14 | End: 2025-01-14

## 2025-01-14 SDOH — ECONOMIC STABILITY: FOOD INSECURITY: WITHIN THE PAST 12 MONTHS, THE FOOD YOU BOUGHT JUST DIDN'T LAST AND YOU DIDN'T HAVE MONEY TO GET MORE.: NEVER TRUE

## 2025-01-14 SDOH — ECONOMIC STABILITY: FOOD INSECURITY: WITHIN THE PAST 12 MONTHS, YOU WORRIED THAT YOUR FOOD WOULD RUN OUT BEFORE YOU GOT MONEY TO BUY MORE.: NEVER TRUE

## 2025-01-14 ASSESSMENT — PATIENT HEALTH QUESTIONNAIRE - PHQ9
SUM OF ALL RESPONSES TO PHQ QUESTIONS 1-9: 0
SUM OF ALL RESPONSES TO PHQ9 QUESTIONS 1 & 2: 0
SUM OF ALL RESPONSES TO PHQ QUESTIONS 1-9: 0
SUM OF ALL RESPONSES TO PHQ QUESTIONS 1-9: 0
1. LITTLE INTEREST OR PLEASURE IN DOING THINGS: NOT AT ALL
2. FEELING DOWN, DEPRESSED OR HOPELESS: NOT AT ALL
SUM OF ALL RESPONSES TO PHQ QUESTIONS 1-9: 0

## 2025-01-15 RX ORDER — MONTELUKAST SODIUM 10 MG/1
10 TABLET ORAL DAILY
Qty: 30 TABLET | Refills: 3 | Status: SHIPPED | OUTPATIENT
Start: 2025-01-15

## 2025-01-15 NOTE — PROGRESS NOTES
Subjective:       Esme Moffett is a 54 y.o. female and is here for a comprehensive physical exam.    History of Present Illness  The patient presents for evaluation of sinus issues, viral infection, and health maintenance.    She reports a history of persistent sinus issues, characterized by constant drainage and an inability to clear her nasal passages through blowing. She experiences significant discomfort during air travel, particularly during descent, which is a new symptom for her. Despite attempts at self-management with over-the-counter Tylenol Sinus and Afrin nasal spray, her symptoms persist. She has a scheduled flight on 01/31/2025 and is seeking advice on potential treatment options. She also reports experiencing pressure in her ears during sleep and occasional auditory disturbances. She has a known history of seasonal allergies.    She initially suspected influenza due to her occupation as a  and the frequent exposure to pathogens from the children. She experienced fever, body aches, and chills, which incapacitated her for several days. On the following Friday and Saturday, she noticed a significant decrease in urination, prompting a visit to urgent care. There, she was diagnosed with a urinary tract infection (UTI) and hematuria, despite the absence of frequent urination or dysuria. The possibility of a kidney stone was suggested, but she reported no back pain. She was prescribed a 7-day course of levofloxacin, which she completed. However, she continued to feel unwell for an additional 4 days, experiencing constant nausea. She maintained adequate hydration throughout this period. Her illness began on 12/17/2024 and 12/18/2024, with the urgent care visit occurring on 12/21/2024. She reports no hematuria or hematochezia.    She has received an email from [facility] regarding colon cancer screening. She uses sunscreen regularly due to her fair skin and freckles.    SOCIAL

## 2025-01-29 ENCOUNTER — HOSPITAL ENCOUNTER (OUTPATIENT)
Age: 55
Setting detail: SPECIMEN
Discharge: HOME | End: 2025-01-29

## 2025-01-29 DIAGNOSIS — Z13.1 DIABETES MELLITUS SCREENING: ICD-10-CM

## 2025-01-29 DIAGNOSIS — Z13.220 ENCOUNTER FOR LIPID SCREENING FOR CARDIOVASCULAR DISEASE: ICD-10-CM

## 2025-01-29 DIAGNOSIS — Z00.00 ENCOUNTER FOR WELL ADULT EXAM WITHOUT ABNORMAL FINDINGS: ICD-10-CM

## 2025-01-29 DIAGNOSIS — Z13.6 ENCOUNTER FOR LIPID SCREENING FOR CARDIOVASCULAR DISEASE: ICD-10-CM

## 2025-01-29 DIAGNOSIS — Z13.220 SCREENING FOR HYPERLIPIDEMIA: ICD-10-CM

## 2025-01-29 LAB
ALBUMIN SERPL-MCNC: 4.3 G/DL (ref 3.5–5.2)
ALBUMIN/GLOB SERPL: 1.7 {RATIO} (ref 1–2.5)
ALP SERPL-CCNC: 56 U/L (ref 35–104)
ALT SERPL-CCNC: 15 U/L (ref 10–35)
ANION GAP SERPL CALCULATED.3IONS-SCNC: 10 MMOL/L (ref 9–16)
AST SERPL-CCNC: 19 U/L (ref 10–35)
BASOPHILS # BLD: 0.06 K/UL (ref 0–0.2)
BASOPHILS NFR BLD: 1 % (ref 0–2)
BILIRUB SERPL-MCNC: 0.3 MG/DL (ref 0–1.2)
BUN SERPL-MCNC: 11 MG/DL (ref 6–20)
CALCIUM SERPL-MCNC: 9.2 MG/DL (ref 8.6–10.4)
CHLORIDE SERPL-SCNC: 104 MMOL/L (ref 98–107)
CHOLEST SERPL-MCNC: 173 MG/DL (ref 0–199)
CHOLESTEROL/HDL RATIO: 3.6
CO2 SERPL-SCNC: 27 MMOL/L (ref 20–31)
CREAT SERPL-MCNC: 0.8 MG/DL (ref 0.6–0.9)
EOSINOPHIL # BLD: 0.24 K/UL (ref 0–0.44)
EOSINOPHILS RELATIVE PERCENT: 4 % (ref 1–4)
ERYTHROCYTE [DISTWIDTH] IN BLOOD BY AUTOMATED COUNT: 12.4 % (ref 11.8–14.4)
EST. AVERAGE GLUCOSE BLD GHB EST-MCNC: 111 MG/DL
GFR, ESTIMATED: 87 ML/MIN/1.73M2
GLUCOSE SERPL-MCNC: 93 MG/DL (ref 74–99)
HBA1C MFR BLD: 5.5 % (ref 4–6)
HCT VFR BLD AUTO: 40 % (ref 36.3–47.1)
HDLC SERPL-MCNC: 48 MG/DL
HGB BLD-MCNC: 13 G/DL (ref 11.9–15.1)
IMM GRANULOCYTES # BLD AUTO: 0.08 K/UL (ref 0–0.3)
IMM GRANULOCYTES NFR BLD: 1 %
LDLC SERPL CALC-MCNC: 101 MG/DL (ref 0–100)
LYMPHOCYTES NFR BLD: 2.39 K/UL (ref 1.1–3.7)
LYMPHOCYTES RELATIVE PERCENT: 36 % (ref 24–43)
MCH RBC QN AUTO: 28.7 PG (ref 25.2–33.5)
MCHC RBC AUTO-ENTMCNC: 32.5 G/DL (ref 28.4–34.8)
MCV RBC AUTO: 88.3 FL (ref 82.6–102.9)
MONOCYTES NFR BLD: 0.69 K/UL (ref 0.1–1.2)
MONOCYTES NFR BLD: 11 % (ref 3–12)
NEUTROPHILS NFR BLD: 47 % (ref 36–65)
NEUTS SEG NFR BLD: 3.14 K/UL (ref 1.5–8.1)
NRBC BLD-RTO: 0 PER 100 WBC
PLATELET # BLD AUTO: 275 K/UL (ref 138–453)
PMV BLD AUTO: 10.3 FL (ref 8.1–13.5)
POTASSIUM SERPL-SCNC: 4.3 MMOL/L (ref 3.7–5.3)
PROT SERPL-MCNC: 6.9 G/DL (ref 6.6–8.7)
RBC # BLD AUTO: 4.53 M/UL (ref 3.95–5.11)
SODIUM SERPL-SCNC: 141 MMOL/L (ref 136–145)
TRIGL SERPL-MCNC: 119 MG/DL
TSH SERPL DL<=0.05 MIU/L-ACNC: 2.09 UIU/ML (ref 0.27–4.2)
VLDLC SERPL CALC-MCNC: 24 MG/DL (ref 1–30)
WBC OTHER # BLD: 6.6 K/UL (ref 3.5–11.3)

## 2025-02-01 ENCOUNTER — HOSPITAL ENCOUNTER (OUTPATIENT)
Dept: MAMMOGRAPHY | Age: 55
End: 2025-02-01
Payer: COMMERCIAL

## 2025-02-01 DIAGNOSIS — Z12.31 ENCOUNTER FOR SCREENING MAMMOGRAM FOR BREAST CANCER: ICD-10-CM

## 2025-02-01 PROCEDURE — 77067 SCR MAMMO BI INCL CAD: CPT

## 2025-02-01 PROCEDURE — 77063 BREAST TOMOSYNTHESIS BI: CPT

## 2025-02-10 LAB — NONINV COLON CA DNA+OCC BLD SCRN STL QL: NEGATIVE

## 2025-07-29 ENCOUNTER — OFFICE VISIT (OUTPATIENT)
Age: 55
End: 2025-07-29

## 2025-07-29 VITALS
DIASTOLIC BLOOD PRESSURE: 85 MMHG | OXYGEN SATURATION: 97 % | BODY MASS INDEX: 28.32 KG/M2 | HEIGHT: 65 IN | HEART RATE: 92 BPM | WEIGHT: 170 LBS | TEMPERATURE: 98.1 F | SYSTOLIC BLOOD PRESSURE: 133 MMHG

## 2025-07-29 DIAGNOSIS — L57.0 ACTINIC KERATOSES: ICD-10-CM

## 2025-07-29 DIAGNOSIS — L81.4 SOLAR LENTIGO: ICD-10-CM

## 2025-07-29 DIAGNOSIS — Z85.828 HISTORY OF BASAL CELL CARCINOMA (BCC): ICD-10-CM

## 2025-07-29 DIAGNOSIS — D48.5 NEOPLASM OF UNCERTAIN BEHAVIOR OF SKIN: ICD-10-CM

## 2025-07-29 DIAGNOSIS — D22.9 MULTIPLE NEVI: Primary | ICD-10-CM

## 2025-07-29 DIAGNOSIS — L82.1 SEBORRHEIC KERATOSES: ICD-10-CM

## 2025-07-29 DIAGNOSIS — Z87.898 HISTORY OF ATYPICAL NEVUS: ICD-10-CM

## 2025-07-29 DIAGNOSIS — D18.01 CHERRY ANGIOMA: ICD-10-CM

## 2025-07-29 NOTE — PROGRESS NOTES
Dermatology Patient Note  [facility] PHYS[facility]  [facility] DERMATOLOGY  [address] [place] [address]  Dept: [phone]  Dept Fax: [phone]      VISITDATE: 7/29/2025   REFERRING PROVIDER: No ref. provider found      Esme Moffett is a 54 y.o. female  who presents today in the office for:    Other (Patient presents today as a 1 yr FBSE.  H/O BCC.  Patient offers no concerns today.  )      HISTORY OF PRESENT ILLNESS:  As above. Patient presents for a 1 year FBSE. She was last seen on 9/13/23. Patient has a history of BCC and atypical nevi. Shave biopsy taken at last visit, 9/13/22, of left upper arm, mid abdomen, and left upper abdomen all revealed dysplastic nevus. Excision recommended for mid abdomen as it was moderately atypical, remaining two were mildly atypical. Excision of atypical nevus on mid back on 11/8/22 with pathology confirmation.    Today, patient denies any lesions of concern. She reports wearing a sun shirt when exposed to the sun.     MEDICAL PROBLEMS:  Patient Active Problem List    Diagnosis Date Noted    History of basal cell carcinoma 08/19/2021    Chronic cough 03/08/2019    POTS (postural orthostatic tachycardia syndrome) 04/30/2018    Fatigue 04/30/2018    Neck stiffness 04/30/2018       CURRENT MEDICATIONS:   Current Outpatient Medications   Medication Sig Dispense Refill    montelukast (SINGULAIR) 10 MG tablet Take 1 tablet by mouth daily 30 tablet 3    pyridostigmine (MESTINON) 60 MG tablet Take 1 tablet by mouth as needed       Current Facility-Administered Medications   Medication Dose Route Frequency Provider Last Rate Last Admin    lidocaine-EPINEPHrine 1 %-1:110076 injection 5 mL  5 mL IntraDERmal Once HeuringBetty MD        lidocaine 1 % injection 1 mL  1 mL IntraDERmal Once HeurBetty neri MD        lidocaine-EPINEPHrine 1 %-1:927480 injection 0.5 mL  0.5 mL IntraDERmal Once HeurBetty neri MD           ALLERGIES:   No Known

## 2025-07-29 NOTE — PATIENT INSTRUCTIONS
Cryotherapy    Liquid Nitrogen - \"freeze\" (Cryotherapy)  Your doctor has treated your skin lesions with a very cold substance.  The liquid nitrogen is so cold that it may feel like the skin is burning during application.  A clear blister or blood blister may form after treatment and may later form a scab.  Leave the area alone.  Usually this scab will fall of within 1-2 weeks.  The area should be kept clean and can be covered with Vaseline and a Band-Aid if needed. If a large blister develops it is ok to use a clean needle to gently pop the blister. Please call our office with any concerns at [phone].    BIOPSY WOUND CARE    A biopsy is where a small piece of skin tissue is removed and examined by a pathologist.  When a biopsy is done, there is a small wound site that requires proper care to prevent infection and scarring.  Some biopsies require sutures and their removal.    How to Care for Biopsy Wound    A.  Leave band-aid or dressing on for 24 hours.  B.  Wash two times a day with soap and water.  C.  Let the wound air dry, then apply Vaseline ointment and cover with a Band-Aid       unless otherwise instructed by your provider.   D.  If there is slight discomfort, you may give acetaminophen or ibuprofen.    When To Call the Doctor    Call the Dermatology Clinic or your doctor if any of the following occur:    A.  Redness and swelling  B.  Tenderness and warm to touch  C.  Drainage from wound  D.  Fever    Biopsy Results    Biopsy results are usually available in 1-2 weeks.  We provide biopsy results in letters or via [employer] for benign results or we will call for any concerning results.  If you have not heard from our staff please call the office within 2 weeks. Please call our office with any concerns at [phone].    Sun Protection     There are two types of sun rays that are harmful to the skin.  UVA rays cause skin aging and skin cancer, such as melanoma.  UVB rays cause sunburns, cataracts, and also

## 2025-07-30 ENCOUNTER — LAB (OUTPATIENT)
Dept: LAB | Age: 55
End: 2025-07-30